# Patient Record
Sex: FEMALE | Race: BLACK OR AFRICAN AMERICAN | Employment: OTHER | URBAN - METROPOLITAN AREA
[De-identification: names, ages, dates, MRNs, and addresses within clinical notes are randomized per-mention and may not be internally consistent; named-entity substitution may affect disease eponyms.]

---

## 2021-04-10 ENCOUNTER — APPOINTMENT (OUTPATIENT)
Dept: CT IMAGING | Age: 86
End: 2021-04-10
Payer: MEDICARE

## 2021-04-10 ENCOUNTER — HOSPITAL ENCOUNTER (EMERGENCY)
Age: 86
Discharge: HOME | End: 2021-04-10
Attending: STUDENT IN AN ORGANIZED HEALTH CARE EDUCATION/TRAINING PROGRAM
Payer: MEDICARE

## 2021-04-10 VITALS
TEMPERATURE: 97.5 F | HEIGHT: 62 IN | HEART RATE: 68 BPM | OXYGEN SATURATION: 99 % | BODY MASS INDEX: 30.91 KG/M2 | DIASTOLIC BLOOD PRESSURE: 74 MMHG | WEIGHT: 168 LBS | SYSTOLIC BLOOD PRESSURE: 172 MMHG | RESPIRATION RATE: 16 BRPM

## 2021-04-10 DIAGNOSIS — I10 UNCONTROLLED HYPERTENSION: Primary | ICD-10-CM

## 2021-04-10 LAB
A/G RATIO: 1.2 (ref 1.1–2.2)
ALBUMIN SERPL-MCNC: 4.1 G/DL (ref 3.4–5)
ALP BLD-CCNC: 102 U/L (ref 40–129)
ALT SERPL-CCNC: 13 U/L (ref 10–40)
ANION GAP SERPL CALCULATED.3IONS-SCNC: 10 MMOL/L (ref 3–16)
AST SERPL-CCNC: 24 U/L (ref 15–37)
BASOPHILS ABSOLUTE: 0 K/UL (ref 0–0.2)
BASOPHILS RELATIVE PERCENT: 0.3 %
BILIRUB SERPL-MCNC: 0.5 MG/DL (ref 0–1)
BUN BLDV-MCNC: 14 MG/DL (ref 7–20)
CALCIUM SERPL-MCNC: 9.9 MG/DL (ref 8.3–10.6)
CHLORIDE BLD-SCNC: 97 MMOL/L (ref 99–110)
CO2: 30 MMOL/L (ref 21–32)
CREAT SERPL-MCNC: 0.8 MG/DL (ref 0.6–1.2)
EOSINOPHILS ABSOLUTE: 0 K/UL (ref 0–0.6)
EOSINOPHILS RELATIVE PERCENT: 0.5 %
GFR AFRICAN AMERICAN: >60
GFR NON-AFRICAN AMERICAN: >60
GLOBULIN: 3.5 G/DL
GLUCOSE BLD-MCNC: 97 MG/DL (ref 70–99)
HCT VFR BLD CALC: 41.3 % (ref 36–48)
HEMOGLOBIN: 13.6 G/DL (ref 12–16)
LACTIC ACID: 1 MMOL/L (ref 0.4–2)
LYMPHOCYTES ABSOLUTE: 1.2 K/UL (ref 1–5.1)
LYMPHOCYTES RELATIVE PERCENT: 17.1 %
MAGNESIUM: 1.7 MG/DL (ref 1.8–2.4)
MCH RBC QN AUTO: 28.1 PG (ref 26–34)
MCHC RBC AUTO-ENTMCNC: 32.8 G/DL (ref 31–36)
MCV RBC AUTO: 85.7 FL (ref 80–100)
MONOCYTES ABSOLUTE: 0.7 K/UL (ref 0–1.3)
MONOCYTES RELATIVE PERCENT: 10.2 %
NEUTROPHILS ABSOLUTE: 5 K/UL (ref 1.7–7.7)
NEUTROPHILS RELATIVE PERCENT: 71.9 %
PDW BLD-RTO: 13.8 % (ref 12.4–15.4)
PLATELET # BLD: 245 K/UL (ref 135–450)
PMV BLD AUTO: 8.7 FL (ref 5–10.5)
POTASSIUM REFLEX MAGNESIUM: 2.9 MMOL/L (ref 3.5–5.1)
RBC # BLD: 4.82 M/UL (ref 4–5.2)
SODIUM BLD-SCNC: 137 MMOL/L (ref 136–145)
TOTAL PROTEIN: 7.6 G/DL (ref 6.4–8.2)
TROPONIN: <0.01 NG/ML
WBC # BLD: 6.9 K/UL (ref 4–11)

## 2021-04-10 PROCEDURE — 93005 ELECTROCARDIOGRAM TRACING: CPT | Performed by: STUDENT IN AN ORGANIZED HEALTH CARE EDUCATION/TRAINING PROGRAM

## 2021-04-10 PROCEDURE — 83735 ASSAY OF MAGNESIUM: CPT

## 2021-04-10 PROCEDURE — 6370000000 HC RX 637 (ALT 250 FOR IP): Performed by: STUDENT IN AN ORGANIZED HEALTH CARE EDUCATION/TRAINING PROGRAM

## 2021-04-10 PROCEDURE — 84484 ASSAY OF TROPONIN QUANT: CPT

## 2021-04-10 PROCEDURE — 70450 CT HEAD/BRAIN W/O DYE: CPT

## 2021-04-10 PROCEDURE — 85025 COMPLETE CBC W/AUTO DIFF WBC: CPT

## 2021-04-10 PROCEDURE — 96365 THER/PROPH/DIAG IV INF INIT: CPT

## 2021-04-10 PROCEDURE — 83605 ASSAY OF LACTIC ACID: CPT

## 2021-04-10 PROCEDURE — 36415 COLL VENOUS BLD VENIPUNCTURE: CPT

## 2021-04-10 PROCEDURE — 6360000002 HC RX W HCPCS: Performed by: STUDENT IN AN ORGANIZED HEALTH CARE EDUCATION/TRAINING PROGRAM

## 2021-04-10 PROCEDURE — 99284 EMERGENCY DEPT VISIT MOD MDM: CPT

## 2021-04-10 PROCEDURE — 96366 THER/PROPH/DIAG IV INF ADDON: CPT

## 2021-04-10 PROCEDURE — 80053 COMPREHEN METABOLIC PANEL: CPT

## 2021-04-10 RX ORDER — ATENOLOL 100 MG/1
150 TABLET ORAL DAILY
COMMUNITY
Start: 2020-11-09

## 2021-04-10 RX ORDER — POTASSIUM CHLORIDE 20 MEQ/1
20 TABLET, EXTENDED RELEASE ORAL ONCE
Status: COMPLETED | OUTPATIENT
Start: 2021-04-10 | End: 2021-04-10

## 2021-04-10 RX ORDER — HYDROCHLOROTHIAZIDE 12.5 MG/1
12.5 CAPSULE, GELATIN COATED ORAL DAILY
COMMUNITY
Start: 2021-03-16 | End: 2021-05-23

## 2021-04-10 RX ORDER — POTASSIUM CHLORIDE 20 MEQ/1
40 TABLET, EXTENDED RELEASE ORAL ONCE
Status: COMPLETED | OUTPATIENT
Start: 2021-04-10 | End: 2021-04-10

## 2021-04-10 RX ORDER — HYDROCHLOROTHIAZIDE 25 MG/1
TABLET ORAL
COMMUNITY
Start: 2021-04-06 | End: 2021-05-23

## 2021-04-10 RX ORDER — ASPIRIN 81 MG/1
81 TABLET ORAL DAILY
COMMUNITY

## 2021-04-10 RX ORDER — MAGNESIUM SULFATE IN WATER 40 MG/ML
2000 INJECTION, SOLUTION INTRAVENOUS ONCE
Status: COMPLETED | OUTPATIENT
Start: 2021-04-10 | End: 2021-04-10

## 2021-04-10 RX ORDER — ATORVASTATIN CALCIUM 80 MG/1
TABLET, FILM COATED ORAL
COMMUNITY
Start: 2020-10-02

## 2021-04-10 RX ADMIN — POTASSIUM CHLORIDE 20 MEQ: 1500 TABLET, EXTENDED RELEASE ORAL at 16:56

## 2021-04-10 RX ADMIN — POTASSIUM CHLORIDE 40 MEQ: 1500 TABLET, EXTENDED RELEASE ORAL at 16:56

## 2021-04-10 RX ADMIN — MAGNESIUM SULFATE HEPTAHYDRATE 2000 MG: 40 INJECTION, SOLUTION INTRAVENOUS at 16:56

## 2021-04-10 ASSESSMENT — ENCOUNTER SYMPTOMS
FACIAL SWELLING: 0
CHEST TIGHTNESS: 0
EYE DISCHARGE: 0
BLOOD IN STOOL: 0
COUGH: 0
ABDOMINAL PAIN: 0
ANAL BLEEDING: 0
CONSTIPATION: 0
APNEA: 0
SHORTNESS OF BREATH: 0
WHEEZING: 0
EYE PAIN: 0
EYE ITCHING: 0
ABDOMINAL DISTENTION: 0

## 2021-04-10 NOTE — ED PROVIDER NOTES
[id number] RESIDENT NOTE       Date of evaluation: 4/10/2021    Chief Complaint     Hypertension (x2 weeks, recently BP medication increase with no relief, +tinnitis)    History of Present Illness     Esau Ernst is a 80 y.o. female past medical history of hypertension, hyperlipidemia, right coronary artery blockage status post aborted stent in 2006 who presented with hypertension. Patient has had multiple visits to the ED where she presents with hypertension above 180s which subsequently comes down when she is discharged. Review of Systems     Review of Systems   Constitutional: Negative for activity change, appetite change, chills and fever. HENT: Positive for tinnitus. Negative for dental problem, drooling, ear discharge, ear pain and facial swelling. Eyes: Negative for pain, discharge and itching. Respiratory: Negative for apnea, cough, chest tightness, shortness of breath and wheezing. Cardiovascular: Negative for chest pain, palpitations and leg swelling. Gastrointestinal: Negative for abdominal distention, abdominal pain, anal bleeding, blood in stool and constipation. Endocrine: Negative for cold intolerance and heat intolerance. Genitourinary: Negative for difficulty urinating, dyspareunia, dysuria, frequency and hematuria. Musculoskeletal: Negative for arthralgias and gait problem. Neurological: Negative for dizziness, seizures, facial asymmetry, speech difficulty and headaches. Psychiatric/Behavioral: Negative for agitation and behavioral problems. Past Medical, Surgical, Family, and Social History     She has a past medical history of Hyperlipidemia and Hypertension. She has no past surgical history on file. Her family history is not on file. She reports that she has never smoked. She has never used smokeless tobacco. She reports previous alcohol use. She reports previous drug use.     Medications     Current Discharge Medication List      CONTINUE these medications which have NOT CHANGED    Details   atenolol (TENORMIN) 100 MG tablet Take 150 mg by mouth daily      atorvastatin (LIPITOR) 80 MG tablet TAKE 1 TABLET BY MOUTH  DAILY      hydroCHLOROthiazide (HYDRODIURIL) 25 MG tablet TAKE 1 TABLET(25 MG) BY MOUTH DAILY      hydroCHLOROthiazide (MICROZIDE) 12.5 MG capsule Take 12.5 mg by mouth daily      aspirin 81 MG EC tablet Take 81 mg by mouth daily             Allergies     She has No Known Allergies. Physical Exam     INITIAL VITALS: BP: (!) 215/76, Temp: 97.5 °F (36.4 °C), Pulse: 66, Resp: 17, SpO2: 97 %   Physical Exam  Constitutional:       General: She is not in acute distress. Appearance: Normal appearance. She is obese. She is not ill-appearing. HENT:      Head: Normocephalic and atraumatic. Mouth/Throat:      Mouth: Mucous membranes are moist.   Eyes:      Extraocular Movements: Extraocular movements intact. Cardiovascular:      Rate and Rhythm: Normal rate and regular rhythm. Pulses: Normal pulses. Heart sounds: Normal heart sounds. No murmur. No gallop. Pulmonary:      Effort: No respiratory distress. Breath sounds: No wheezing, rhonchi or rales. Chest:      Chest wall: No tenderness. Abdominal:      Palpations: Abdomen is soft. Tenderness: There is no right CVA tenderness or left CVA tenderness. Musculoskeletal:         General: No swelling or tenderness. Right lower leg: No edema. Left lower leg: No edema. Skin:     General: Skin is warm. Capillary Refill: Capillary refill takes less than 2 seconds. Neurological:      Mental Status: She is alert and oriented to person, place, and time. Cranial Nerves: No cranial nerve deficit. Sensory: No sensory deficit.    Psychiatric:         Mood and Affect: Mood normal.         Behavior: Behavior normal.          Diagnostic Results     EKG   Normal sinus rhythm, no ST changes, no bundle branch blocks. RADIOLOGY:  CT HEAD WO CONTRAST   Final Result      1. No findings for acute intracranial abnormality. There is increased attenuation within the left M1 segment of the left middle cerebral artery. This is nonspecific however may reflect \"dense MCA sign\". If there is concern for stroke, follow-up study    or further valuation with MRI imaging is recommended. 2.  Age-related atrophy with extensive periventricular and subcortical white matter changes consistent with chronic small vessel ischemia.           LABS:   Results for orders placed or performed during the hospital encounter of 04/10/21   CBC auto differential   Result Value Ref Range    WBC 6.9 4.0 - 11.0 K/uL    RBC 4.82 4.00 - 5.20 M/uL    Hemoglobin 13.6 12.0 - 16.0 g/dL    Hematocrit 41.3 36.0 - 48.0 %    MCV 85.7 80.0 - 100.0 fL    MCH 28.1 26.0 - 34.0 pg    MCHC 32.8 31.0 - 36.0 g/dL    RDW 13.8 12.4 - 15.4 %    Platelets 022 957 - 893 K/uL    MPV 8.7 5.0 - 10.5 fL    Neutrophils % 71.9 %    Lymphocytes % 17.1 %    Monocytes % 10.2 %    Eosinophils % 0.5 %    Basophils % 0.3 %    Neutrophils Absolute 5.0 1.7 - 7.7 K/uL    Lymphocytes Absolute 1.2 1.0 - 5.1 K/uL    Monocytes Absolute 0.7 0.0 - 1.3 K/uL    Eosinophils Absolute 0.0 0.0 - 0.6 K/uL    Basophils Absolute 0.0 0.0 - 0.2 K/uL   Comprehensive Metabolic Panel w/ Reflex to MG   Result Value Ref Range    Sodium 137 136 - 145 mmol/L    Potassium reflex Magnesium 2.9 (LL) 3.5 - 5.1 mmol/L    Chloride 97 (L) 99 - 110 mmol/L    CO2 30 21 - 32 mmol/L    Anion Gap 10 3 - 16    Glucose 97 70 - 99 mg/dL    BUN 14 7 - 20 mg/dL    CREATININE 0.8 0.6 - 1.2 mg/dL    GFR Non-African American >60 >60    GFR African American >60 >60    Calcium 9.9 8.3 - 10.6 mg/dL    Total Protein 7.6 6.4 - 8.2 g/dL    Albumin 4.1 3.4 - 5.0 g/dL    Albumin/Globulin Ratio 1.2 1.1 - 2.2    Total Bilirubin 0.5 0.0 - 1.0 mg/dL    Alkaline Phosphatase 102 40 - 129 U/L    ALT 13 10 - 40 U/L    AST 24 15 - 37 U/L creased intake. Her electrolytes were replaced. She was educated on the importance of a low-sodium and a diet high in fruits and vegetables. She was instructed to follow-up with her PCP in 3 days for blood pressure management and was instructed on returning to the ED if she develop any new symptoms including but not limited to headache, chest pain, back pain. This patient was also evaluated by the attending physician. All care plans were discussed and agreed upon. Clinical Impression     1.  Uncontrolled hypertension        Disposition     PATIENT REFERRED TO:  Dominic Nunez  [address] [place] [address] [phone]    In 3 days  for blood pressure management      DISCHARGE MEDICATIONS:  Current Discharge Medication List          DISPOSITION     Decision to discharge     Brianda Martinez MD  Resident  04/10/21 6319       Brianda Martinez MD  Resident  04/10/21 6534

## 2021-04-10 NOTE — ED PROVIDER NOTES
ED Attending Attestation Note     Date of evaluation: 4/10/2021    This patient was seen by the resident. I have seen and examined the patient, agree with the workup, evaluation, management and diagnosis. The care plan has been discussed. I have reviewed the ECG and concur with the resident's interpretation. My assessment reveals 79 y/o F with hx of HTN, HLD presents to the ED for elevated blood pressure readings. This has been an ongoing issue for her and she has been on several different blood pressure medications. Her doctor recently increased her HCTZ dose. She had a dull headache earlier today and noted her BP to be >200 on her home machine. She also endorses \"ringing\" in her ears. Denies chest pain, sob, vision change, numbness, tingling, speech change, gait change. On arrival,  systolic in triage, improved to 179 on exam.  Trended down to 165/66 in the ED without any medication. GCS 15, CN 2-12 intact, BUE/BLE strength 5/5, sensation grossly intact, gait normal.    CT head negative for acute intracranial process. Radiologist did note nonspecific finding of left M1 MCA segment hyperattenuation which could be a dense MCA sign. However, the patient has no stroke like symptoms and repeat neuro exam prior to discharge remained normal.  I do not feel that she is having a stroke or that MRI is warranted at this time. EKG negative for ischemic changes. Labs notable for hypokalemia and hypomagnesemia. Repletion ordered in the ED. Patient's BP remained stable. She and her family were counseled extensively on the need to follow up with PCP for better BP control. She was instructed to take one of her BP meds in the morning and the other at night (she has been taking both together) to see if it helps control the drastic swings in her BP. She was also given return precautions for any worsening symptoms or signs of stroke.      Jerrod Romero MD  04/10/21 [id number]

## 2021-04-10 NOTE — ED NOTES
Bed: B24-24  Expected date:   Expected time:   Means of arrival:   Comments:  [address] Rio Vazquez RN  04/10/21 1506

## 2021-04-11 LAB
EKG ATRIAL RATE: 60 BPM
EKG DIAGNOSIS: NORMAL
EKG P AXIS: 60 DEGREES
EKG P-R INTERVAL: 220 MS
EKG Q-T INTERVAL: 408 MS
EKG QRS DURATION: 76 MS
EKG QTC CALCULATION (BAZETT): 408 MS
EKG R AXIS: 2 DEGREES
EKG T AXIS: 24 DEGREES
EKG VENTRICULAR RATE: 60 BPM

## 2021-04-23 ENCOUNTER — HOSPITAL ENCOUNTER (EMERGENCY)
Age: 86
Discharge: HOME | End: 2021-04-23
Attending: EMERGENCY MEDICINE
Payer: MEDICARE

## 2021-04-23 ENCOUNTER — APPOINTMENT (OUTPATIENT)
Dept: GENERAL RADIOLOGY | Age: 86
End: 2021-04-23
Payer: MEDICARE

## 2021-04-23 VITALS
TEMPERATURE: 97.8 F | SYSTOLIC BLOOD PRESSURE: 168 MMHG | BODY MASS INDEX: 31.19 KG/M2 | WEIGHT: 169.5 LBS | HEIGHT: 62 IN | HEART RATE: 58 BPM | OXYGEN SATURATION: 95 % | DIASTOLIC BLOOD PRESSURE: 69 MMHG | RESPIRATION RATE: 16 BRPM

## 2021-04-23 DIAGNOSIS — I10 ESSENTIAL HYPERTENSION: ICD-10-CM

## 2021-04-23 DIAGNOSIS — E87.6 HYPOKALEMIA: Primary | ICD-10-CM

## 2021-04-23 LAB
A/G RATIO: 1.3 (ref 1.1–2.2)
ALBUMIN SERPL-MCNC: 3.9 G/DL (ref 3.4–5)
ALP BLD-CCNC: 93 U/L (ref 40–129)
ALT SERPL-CCNC: 13 U/L (ref 10–40)
ANION GAP SERPL CALCULATED.3IONS-SCNC: 10 MMOL/L (ref 3–16)
AST SERPL-CCNC: 22 U/L (ref 15–37)
BACTERIA: ABNORMAL /HPF
BASOPHILS ABSOLUTE: 0 K/UL (ref 0–0.2)
BASOPHILS RELATIVE PERCENT: 0.5 %
BILIRUB SERPL-MCNC: 0.6 MG/DL (ref 0–1)
BILIRUBIN URINE: NEGATIVE
BLOOD, URINE: NEGATIVE
BUN BLDV-MCNC: 11 MG/DL (ref 7–20)
CALCIUM SERPL-MCNC: 9.7 MG/DL (ref 8.3–10.6)
CHLORIDE BLD-SCNC: 95 MMOL/L (ref 99–110)
CLARITY: CLEAR
CO2: 29 MMOL/L (ref 21–32)
COLOR: YELLOW
CREAT SERPL-MCNC: 0.9 MG/DL (ref 0.6–1.2)
EKG ATRIAL RATE: 60 BPM
EKG DIAGNOSIS: NORMAL
EKG P AXIS: 64 DEGREES
EKG P-R INTERVAL: 208 MS
EKG Q-T INTERVAL: 390 MS
EKG QRS DURATION: 76 MS
EKG QTC CALCULATION (BAZETT): 390 MS
EKG R AXIS: 8 DEGREES
EKG T AXIS: 43 DEGREES
EKG VENTRICULAR RATE: 60 BPM
EOSINOPHILS ABSOLUTE: 0.1 K/UL (ref 0–0.6)
EOSINOPHILS RELATIVE PERCENT: 0.9 %
EPITHELIAL CELLS, UA: ABNORMAL /HPF (ref 0–5)
GFR AFRICAN AMERICAN: >60
GFR NON-AFRICAN AMERICAN: 59
GLOBULIN: 3 G/DL
GLUCOSE BLD-MCNC: 118 MG/DL (ref 70–99)
GLUCOSE URINE: NEGATIVE MG/DL
HCT VFR BLD CALC: 42.7 % (ref 36–48)
HEMOGLOBIN: 14.3 G/DL (ref 12–16)
KETONES, URINE: NEGATIVE MG/DL
LEUKOCYTE ESTERASE, URINE: ABNORMAL
LIPASE: 26 U/L (ref 13–60)
LYMPHOCYTES ABSOLUTE: 1.5 K/UL (ref 1–5.1)
LYMPHOCYTES RELATIVE PERCENT: 22.6 %
MAGNESIUM: 1.8 MG/DL (ref 1.8–2.4)
MCH RBC QN AUTO: 28.3 PG (ref 26–34)
MCHC RBC AUTO-ENTMCNC: 33.5 G/DL (ref 31–36)
MCV RBC AUTO: 84.6 FL (ref 80–100)
MICROSCOPIC EXAMINATION: YES
MONOCYTES ABSOLUTE: 0.7 K/UL (ref 0–1.3)
MONOCYTES RELATIVE PERCENT: 11 %
NEUTROPHILS ABSOLUTE: 4.4 K/UL (ref 1.7–7.7)
NEUTROPHILS RELATIVE PERCENT: 65 %
NITRITE, URINE: NEGATIVE
PDW BLD-RTO: 13.7 % (ref 12.4–15.4)
PH UA: 7 (ref 5–8)
PLATELET # BLD: 246 K/UL (ref 135–450)
PMV BLD AUTO: 8.9 FL (ref 5–10.5)
POTASSIUM REFLEX MAGNESIUM: 3.1 MMOL/L (ref 3.5–5.1)
PRO-BNP: 143 PG/ML (ref 0–449)
PROTEIN UA: NEGATIVE MG/DL
RBC # BLD: 5.05 M/UL (ref 4–5.2)
RBC UA: ABNORMAL /HPF (ref 0–4)
SODIUM BLD-SCNC: 134 MMOL/L (ref 136–145)
SPECIFIC GRAVITY UA: 1.01 (ref 1–1.03)
TOTAL PROTEIN: 6.9 G/DL (ref 6.4–8.2)
TROPONIN: <0.01 NG/ML
URINE TYPE: ABNORMAL
UROBILINOGEN, URINE: 0.2 E.U./DL
WBC # BLD: 6.7 K/UL (ref 4–11)
WBC UA: ABNORMAL /HPF (ref 0–5)

## 2021-04-23 PROCEDURE — 83735 ASSAY OF MAGNESIUM: CPT

## 2021-04-23 PROCEDURE — 99285 EMERGENCY DEPT VISIT HI MDM: CPT

## 2021-04-23 PROCEDURE — 6370000000 HC RX 637 (ALT 250 FOR IP): Performed by: EMERGENCY MEDICINE

## 2021-04-23 PROCEDURE — 83880 ASSAY OF NATRIURETIC PEPTIDE: CPT

## 2021-04-23 PROCEDURE — 80053 COMPREHEN METABOLIC PANEL: CPT

## 2021-04-23 PROCEDURE — 83690 ASSAY OF LIPASE: CPT

## 2021-04-23 PROCEDURE — 81001 URINALYSIS AUTO W/SCOPE: CPT

## 2021-04-23 PROCEDURE — 85025 COMPLETE CBC W/AUTO DIFF WBC: CPT

## 2021-04-23 PROCEDURE — 93005 ELECTROCARDIOGRAM TRACING: CPT | Performed by: EMERGENCY MEDICINE

## 2021-04-23 PROCEDURE — 84484 ASSAY OF TROPONIN QUANT: CPT

## 2021-04-23 PROCEDURE — 71045 X-RAY EXAM CHEST 1 VIEW: CPT

## 2021-04-23 PROCEDURE — 36415 COLL VENOUS BLD VENIPUNCTURE: CPT

## 2021-04-23 RX ORDER — POTASSIUM CHLORIDE 20 MEQ/1
20 TABLET, EXTENDED RELEASE ORAL ONCE
Status: COMPLETED | OUTPATIENT
Start: 2021-04-23 | End: 2021-04-23

## 2021-04-23 RX ORDER — POTASSIUM CHLORIDE 20 MEQ/1
20 TABLET, EXTENDED RELEASE ORAL DAILY
Qty: 30 TABLET | Refills: 0 | Status: SHIPPED | OUTPATIENT
Start: 2021-04-23

## 2021-04-23 RX ADMIN — POTASSIUM CHLORIDE 20 MEQ: 1500 TABLET, EXTENDED RELEASE ORAL at 06:54

## 2021-04-23 ASSESSMENT — ENCOUNTER SYMPTOMS
DIARRHEA: 0
COUGH: 0
SORE THROAT: 0
SHORTNESS OF BREATH: 0
VOMITING: 0

## 2021-04-23 NOTE — ED NOTES
Bed: A10-10  Expected date:   Expected time:   Means of arrival:   Comments:  EULOGIO 0999 Dutch Morales RN  04/23/21 5077

## 2021-04-23 NOTE — ED NOTES
Pt states that she woke up at 0100 with abdominal/chest pain that is pressure like in nature. Pt thought it was gas, but when it didn't improve, she checked her blood pressure at home and states it was >610 systolic. Pt called 911 to bring her to the ED for further evaluation.       Esdras Soto RN  04/23/21 1038

## 2021-04-23 NOTE — ED PROVIDER NOTES
past medical history of Hyperlipidemia and Hypertension. She has no past surgical history on file. Her family history is not on file. She reports that she has never smoked. She has never used smokeless tobacco. She reports previous alcohol use. She reports previous drug use. Medications     Previous Medications    ASPIRIN 81 MG EC TABLET    Take 81 mg by mouth daily    ATENOLOL (TENORMIN) 100 MG TABLET    Take 150 mg by mouth daily    ATORVASTATIN (LIPITOR) 80 MG TABLET    TAKE 1 TABLET BY MOUTH  DAILY    HYDROCHLOROTHIAZIDE (HYDRODIURIL) 25 MG TABLET    TAKE 1 TABLET(25 MG) BY MOUTH DAILY    HYDROCHLOROTHIAZIDE (MICROZIDE) 12.5 MG CAPSULE    Take 12.5 mg by mouth daily       Allergies     She has No Known Allergies. Physical Exam     VITALS: BP (!) 168/69   Pulse 58   Temp 97.8 °F (36.6 °C) (Oral)   Resp 16   Ht 5' 2\" (1.575 m)   Wt 169 lb 8 oz (76.9 kg)   SpO2 95%   Breastfeeding No   BMI 31.00 kg/m²    General: Well developed, well nourished female in no acute distress. HEENT: Sclera white, conjunctiva pink, mucous membranes moist and oropharynx clear  Neck: Supple, no meningismus or JVD  Respirations: Unlabored with symmetric chest rise and fall, lungs clear  CV: Regular rate and rhythm with strong distal pulses, no murmur  Abd: Soft without rebound guarding distention or focal tenderness  Musculoskeletal: Moves all extremities with no signs of orthopedic trauma or edema. Skin: Warm and dry with no rashes or lesions. Neuro: Awake and alert with no focal neurologic deficits. Normal speech and gait. Face symmetric. Extraocular movements are intact. Pupils normal.  Sensation intact in all 4 extremities. Psych: Mood and affect are normal.    Diagnostic Results     EKG   Indication: Epigastric pressure. Interpreted by me. Normal sinus rhythm, normal axis and intervals, rate is 60. No ST segment or T wave changes.   Interpretation: Normal sinus rhythm without signs of acute ischemia or infarction.     RADIOLOGY:  XR CHEST PORTABLE   Final Result     No acute findings in the chest.              LABS:   Results for orders placed or performed during the hospital encounter of 04/23/21   CBC Auto Differential   Result Value Ref Range    WBC 6.7 4.0 - 11.0 K/uL    RBC 5.05 4.00 - 5.20 M/uL    Hemoglobin 14.3 12.0 - 16.0 g/dL    Hematocrit 42.7 36.0 - 48.0 %    MCV 84.6 80.0 - 100.0 fL    MCH 28.3 26.0 - 34.0 pg    MCHC 33.5 31.0 - 36.0 g/dL    RDW 13.7 12.4 - 15.4 %    Platelets 232 091 - 179 K/uL    MPV 8.9 5.0 - 10.5 fL    Neutrophils % 65.0 %    Lymphocytes % 22.6 %    Monocytes % 11.0 %    Eosinophils % 0.9 %    Basophils % 0.5 %    Neutrophils Absolute 4.4 1.7 - 7.7 K/uL    Lymphocytes Absolute 1.5 1.0 - 5.1 K/uL    Monocytes Absolute 0.7 0.0 - 1.3 K/uL    Eosinophils Absolute 0.1 0.0 - 0.6 K/uL    Basophils Absolute 0.0 0.0 - 0.2 K/uL   Comprehensive Metabolic Panel w/ Reflex to MG   Result Value Ref Range    Sodium 134 (L) 136 - 145 mmol/L    Potassium reflex Magnesium 3.1 (L) 3.5 - 5.1 mmol/L    Chloride 95 (L) 99 - 110 mmol/L    CO2 29 21 - 32 mmol/L    Anion Gap 10 3 - 16    Glucose 118 (H) 70 - 99 mg/dL    BUN 11 7 - 20 mg/dL    CREATININE 0.9 0.6 - 1.2 mg/dL    GFR Non- 59 (A) >60    GFR African American >60 >60    Calcium 9.7 8.3 - 10.6 mg/dL    Total Protein 6.9 6.4 - 8.2 g/dL    Albumin 3.9 3.4 - 5.0 g/dL    Albumin/Globulin Ratio 1.3 1.1 - 2.2    Total Bilirubin 0.6 0.0 - 1.0 mg/dL    Alkaline Phosphatase 93 40 - 129 U/L    ALT 13 10 - 40 U/L    AST 22 15 - 37 U/L    Globulin 3.0 g/dL   Lipase   Result Value Ref Range    Lipase 26.0 13.0 - 60.0 U/L   Urinalysis, reflex to microscopic   Result Value Ref Range    Color, UA Yellow Straw/Yellow    Clarity, UA Clear Clear    Glucose, Ur Negative Negative mg/dL    Bilirubin Urine Negative Negative    Ketones, Urine Negative Negative mg/dL    Specific Gravity, UA 1.010 1.005 - 1.030    Blood, Urine Negative Negative    pH, UA 7.0 pressure measurements, and general outpatient course. They are comfortable in continuing to follow with the primary care physician. MEDICAL DECISION MAKING / ASSESSMENT / Tania Esteban is a 80 y.o. female who presents to the emergency department concerned about her blood pressure and with epigastric discomfort. Her epigastric symptoms are not wholly consistent with acute coronary syndrome, as they are not exertional and not associated with other symptoms. EKG and troponin are reassuring. Labs evaluating for kidney disease and hepatobiliary source of her symptoms are also reassuring. No evidence of urinary tract infection. She does have a history of GERD, endorses that this feels somewhat similar, and that she is not overly concerned with this aspect of her presentation. We discussed the potential of cross-sectional imaging to further evaluate, and she politely declines. She will come back to the emergency department if the symptoms worsen or if new ones develop. She is most concerned about her elevated blood pressure and variability in her blood pressure. No evidence of endorgan dysfunction. Her medications were adjusted less than 2 weeks ago, and she has planned follow-up in a few days. She is encouraged to continue her medications and follow-up as planned. She is also encouraged to only take her blood pressure once a day, and to monitor for symptom development over a numerical value. We discussed signs and symptoms of stroke, CHF, renal failure, and MI. She was found to have mild hypokalemia without significant hypomagnesemia. Her new medication includes hydrochlorothiazide, which may be a contributor. She was given potassium in the emergency department, and will be given a prescription to go home. She is encouraged to continue to follow this with her primary care physician as her medications are adjusted. Clinical Impression     1. Hypokalemia    2.  Essential hypertension Disposition     PATIENT REFERRED TO:  Christy Glover  [address]  [address] [facility] [address] [phone]    Go to   For re-evaluation, follow-up, and discuss ED visit on Monday as already scheduled    [facility], [facility]. Emergency [address]  [phone]    As needed, If symptoms worsen      DISCHARGE MEDICATIONS:  Discharge Medication List as of 4/23/2021  6:51 AM      START taking these medications    Details   potassium chloride (KLOR-CON M) 20 MEQ extended release tablet Take 1 tablet by mouth daily, Disp-30 tablet, R-0Print             DISPOSITION Decision To Discharge 04/23/2021 06:30:05 AM          Charmayne Lamer, MD  04/23/21 0578

## 2021-04-23 NOTE — ED NOTES
Patient prepared for and ready to be discharged. Dressed in clothes and given belongings. IV removed, pt tolerated well, no complications. Patient discharged at this time in no acute distress after she verbalized understanding of discharge instructions. Reviewed medications, and when to return to the ED with patient. Encouraged follow up with PCP  Patient walked to lobby, Family to take patient home.        Nicol Fraire RN  04/23/21 7545

## 2021-05-23 ENCOUNTER — APPOINTMENT (OUTPATIENT)
Dept: GENERAL RADIOLOGY | Age: 86
End: 2021-05-23
Payer: MEDICARE

## 2021-05-23 ENCOUNTER — HOSPITAL ENCOUNTER (EMERGENCY)
Age: 86
Discharge: HOME | End: 2021-05-23
Attending: EMERGENCY MEDICINE
Payer: MEDICARE

## 2021-05-23 VITALS
OXYGEN SATURATION: 100 % | HEART RATE: 65 BPM | DIASTOLIC BLOOD PRESSURE: 64 MMHG | RESPIRATION RATE: 17 BRPM | SYSTOLIC BLOOD PRESSURE: 149 MMHG | TEMPERATURE: 97.8 F

## 2021-05-23 DIAGNOSIS — I10 EPISODE OF HYPERTENSION: Primary | ICD-10-CM

## 2021-05-23 LAB
ANION GAP SERPL CALCULATED.3IONS-SCNC: 10 MMOL/L (ref 3–16)
BASOPHILS ABSOLUTE: 0 K/UL (ref 0–0.2)
BASOPHILS RELATIVE PERCENT: 0.7 %
BUN BLDV-MCNC: 15 MG/DL (ref 7–20)
CALCIUM SERPL-MCNC: 10.1 MG/DL (ref 8.3–10.6)
CHLORIDE BLD-SCNC: 99 MMOL/L (ref 99–110)
CO2: 28 MMOL/L (ref 21–32)
CREAT SERPL-MCNC: 0.8 MG/DL (ref 0.6–1.2)
EKG ATRIAL RATE: 62 BPM
EKG DIAGNOSIS: NORMAL
EKG P AXIS: 60 DEGREES
EKG P-R INTERVAL: 218 MS
EKG Q-T INTERVAL: 392 MS
EKG QRS DURATION: 86 MS
EKG QTC CALCULATION (BAZETT): 397 MS
EKG R AXIS: 10 DEGREES
EKG T AXIS: 24 DEGREES
EKG VENTRICULAR RATE: 62 BPM
EOSINOPHILS ABSOLUTE: 0.1 K/UL (ref 0–0.6)
EOSINOPHILS RELATIVE PERCENT: 1.4 %
GFR AFRICAN AMERICAN: >60
GFR NON-AFRICAN AMERICAN: >60
GLUCOSE BLD-MCNC: 100 MG/DL (ref 70–99)
HCT VFR BLD CALC: 38.1 % (ref 36–48)
HEMOGLOBIN: 12.6 G/DL (ref 12–16)
LYMPHOCYTES ABSOLUTE: 1.1 K/UL (ref 1–5.1)
LYMPHOCYTES RELATIVE PERCENT: 20 %
MCH RBC QN AUTO: 28.4 PG (ref 26–34)
MCHC RBC AUTO-ENTMCNC: 33.1 G/DL (ref 31–36)
MCV RBC AUTO: 85.7 FL (ref 80–100)
MONOCYTES ABSOLUTE: 0.6 K/UL (ref 0–1.3)
MONOCYTES RELATIVE PERCENT: 9.9 %
NEUTROPHILS ABSOLUTE: 3.9 K/UL (ref 1.7–7.7)
NEUTROPHILS RELATIVE PERCENT: 68 %
PDW BLD-RTO: 14.1 % (ref 12.4–15.4)
PLATELET # BLD: 231 K/UL (ref 135–450)
PMV BLD AUTO: 8.7 FL (ref 5–10.5)
POTASSIUM REFLEX MAGNESIUM: 4 MMOL/L (ref 3.5–5.1)
RBC # BLD: 4.45 M/UL (ref 4–5.2)
SODIUM BLD-SCNC: 137 MMOL/L (ref 136–145)
TROPONIN: <0.01 NG/ML
WBC # BLD: 5.7 K/UL (ref 4–11)

## 2021-05-23 PROCEDURE — 71046 X-RAY EXAM CHEST 2 VIEWS: CPT

## 2021-05-23 PROCEDURE — 85025 COMPLETE CBC W/AUTO DIFF WBC: CPT

## 2021-05-23 PROCEDURE — 99283 EMERGENCY DEPT VISIT LOW MDM: CPT

## 2021-05-23 PROCEDURE — 84484 ASSAY OF TROPONIN QUANT: CPT

## 2021-05-23 PROCEDURE — 36415 COLL VENOUS BLD VENIPUNCTURE: CPT

## 2021-05-23 PROCEDURE — 80048 BASIC METABOLIC PNL TOTAL CA: CPT

## 2021-05-23 PROCEDURE — 93005 ELECTROCARDIOGRAM TRACING: CPT | Performed by: EMERGENCY MEDICINE

## 2021-05-23 RX ORDER — LISINOPRIL AND HYDROCHLOROTHIAZIDE 12.5; 1 MG/1; MG/1
1 TABLET ORAL DAILY
COMMUNITY
Start: 2021-04-12

## 2021-05-23 ASSESSMENT — PAIN SCALES - GENERAL: PAINLEVEL_OUTOF10: 3

## 2021-05-23 ASSESSMENT — PAIN DESCRIPTION - DESCRIPTORS: DESCRIPTORS: ACHING

## 2021-05-23 ASSESSMENT — PAIN DESCRIPTION - PAIN TYPE: TYPE: ACUTE PAIN

## 2021-05-23 ASSESSMENT — PAIN DESCRIPTION - LOCATION: LOCATION: HEAD

## 2021-05-23 NOTE — ED PROVIDER NOTES
dysarthria or obvious aphasia. Moving all extremities spontaneously. Skin: Warm, dry. No rash. No diaphoresis or erythema. Psychiatric: Calm and cooperative with appropriate mood and affect. Procedures   Procedures    MEDICAL DECISION MAKING     MDM: Ro Parker is a 80 y.o. female with history as above presenting to the emergency department today for concerns of hypertension and upper abdominal/lower chest pressure that has since resolved. On arrival, she is in no distress, vital signs notable for blood pressure in the 457Q systolic. This does spontaneously improve into the 140s without further intervention. Her physical exam is very reassuring and her EKG is unchanged from previous. She has no further symptoms at this time, however a basic work-up with labs, chest x-ray, and troponin were obtained. Her troponin is negative, chest x-ray reassuring, and given no new EKG changes, I feel this is very unlikely to represent a cardiac source. She is overall reassured and feels comfortable returning home. We will continue to monitor blood pressure routinely and follow closely with her PCP. Discharged in stable condition with written and verbal instructions for which to return to the ED. Clinical Impression     1. Episode of hypertension        Disposition     DISPOSITION Decision To Discharge 05/23/2021 05:09:29 AM        Sherrie Barber MD  7:40 AM                     Past Medical, Surgical, Family, and Social History     She has a past medical history of Hyperlipidemia and Hypertension. She has no past surgical history on file. Her family history is not on file. She reports that she has never smoked. She has never used smokeless tobacco. She reports previous alcohol use. She reports previous drug use.     Medications     Discharge Medication List as of 5/23/2021  5:34 AM      CONTINUE these medications which have NOT CHANGED    Details   lisinopril-hydroCHLOROthiazide (PRINZIDE;ZESTORETIC) 10-12.5 MG per tablet Take 1 tablet by mouth dailyHistorical Med      potassium chloride (KLOR-CON M) 20 MEQ extended release tablet Take 1 tablet by mouth daily, Disp-30 tablet, R-0Print      atenolol (TENORMIN) 100 MG tablet Take 150 mg by mouth dailyHistorical Med      aspirin 81 MG EC tablet Take 81 mg by mouth dailyHistorical Med      atorvastatin (LIPITOR) 80 MG tablet TAKE 1 TABLET BY MOUTH  DAILYHistorical Med             Allergies     She has No Known Allergies. ED Course     Nursing Notes, Past Medical Hx, Past Surgical Hx, Social Hx,Allergies, and Family Hx were reviewed. Patient was given the following medications:  No orders of the defined types were placed in this encounter. Diagnostic Results     EKG   Normal sinus rhythm, measurable intervals normal, axis normal.  No ST or T wave changes suggestive of acute ischemia. Compared to previous dated 4/23/2021, not significantly changed. RECENT VITALS:  BP: (!) 149/64,Temp: 97.8 °F (36.6 °C), Pulse: 65, Resp: 17, SpO2: 100 %     RADIOLOGY:  XR CHEST (2 VW)   Final Result     No acute cardiopulmonary process or significant alteration from the    previous examination.           LABS:   Results for orders placed or performed during the hospital encounter of 05/23/21   CBC Auto Differential   Result Value Ref Range    WBC 5.7 4.0 - 11.0 K/uL    RBC 4.45 4.00 - 5.20 M/uL    Hemoglobin 12.6 12.0 - 16.0 g/dL    Hematocrit 38.1 36.0 - 48.0 %    MCV 85.7 80.0 - 100.0 fL    MCH 28.4 26.0 - 34.0 pg    MCHC 33.1 31.0 - 36.0 g/dL    RDW 14.1 12.4 - 15.4 %    Platelets 273 045 - 917 K/uL    MPV 8.7 5.0 - 10.5 fL    Neutrophils % 68.0 %    Lymphocytes % 20.0 %    Monocytes % 9.9 %    Eosinophils % 1.4 %    Basophils % 0.7 %    Neutrophils Absolute 3.9 1.7 - 7.7 K/uL    Lymphocytes Absolute 1.1 1.0 - 5.1 K/uL    Monocytes Absolute 0.6 0.0 - 1.3 K/uL    Eosinophils Absolute 0.1 0.0 - 0.6 K/uL    Basophils Absolute 0.0 0.0 - 0.2 K/uL   Basic Metabolic Panel w/ Reflex to MG   Result Value Ref Range    Sodium 137 136 - 145 mmol/L    Potassium reflex Magnesium 4.0 3.5 - 5.1 mmol/L    Chloride 99 99 - 110 mmol/L    CO2 28 21 - 32 mmol/L    Anion Gap 10 3 - 16    Glucose 100 (H) 70 - 99 mg/dL    BUN 15 7 - 20 mg/dL    CREATININE 0.8 0.6 - 1.2 mg/dL    GFR Non-African American >60 >60    GFR African American >60 >60    Calcium 10.1 8.3 - 10.6 mg/dL   Troponin   Result Value Ref Range    Troponin <0.01 <0.01 ng/mL       CONSULTS:  None    PATIENT REFERRED TO:  [facility] Emergency Department  [address] [facility] [address]  [phone]    If symptoms worsen    [place] [address] [facility]    Schedule an appointment as soon as possible for a visit in 5 days  For reexamination      DISCHARGE MEDICATIONS:  Discharge Medication List as of 5/23/2021  5:34 AM             Arvin Beard MD  05/23/21 2158

## 2022-11-01 NOTE — ED NOTES
Attempted IV access x2.  Second nurse to try     Cloretta Charles, DESEAN  04/23/21 9878 Continuity of Care Form    Patient Name: Laina Concepcion   :  1965  MRN:  [id number]    Admit date:  10/31/2022  Discharge date:  2022    Code Status Order: DNR-CC   Advance Directives:     Admitting Physician:  Tao Shin MD  PCP: Hughes Galeazzi, MD    Discharging Nurse: Gorge Garcia Unit/Room#: D8J-1634/2106-01  Discharging Unit Phone Number: [phone]    Emergency Contact:   Extended Emergency Contact Information  Primary Emergency Contact:  [facility] Phone: [phone]  Mobile Phone: [phone]  Relation: Child  Secondary Emergency Contact: [address] Phone: [phone]  Relation: Aunt/Uncle    Past Surgical History:  Past Surgical History:   Procedure Laterality Date    GASTROSTOMY TUBE PLACEMENT N/A 10/13/2022    ESOPHAGOGASTRODUODENOSCOPY WITH  PERCUTANEOUS ENDOSCOPIC GASTROSTOMY  TUBE PLACEMENT performed by Reid Daniel MD at [place] [address]  10/13/2022    EGD BIOPSY performed by Reid Daniel MD at [facility] ENDOSCOPY       Immunization History:   Immunization History   Administered Date(s) Administered    COVID-19, PFIZER PURPLE top, DILUTE for use, (age 15 y+), 30mcg/0.3mL 2021, 2021, 2021       Active Problems:  Patient Active Problem List   Diagnosis Code    Altered mental status R41.82    Hypernatremia E87.0    Hypokalemia E87.6    GARRETT (acute kidney injury) ([facility] [address] N17.9    Severe malnutrition ([facility] [address] E43    Acute encephalopathy G93.40    AMS (altered mental status) R41.82    Septicemia ([facility] [address] A41.9       Isolation/Infection:   Isolation            No Isolation          Patient Infection Status       Infection Onset Added Last Indicated Last Indicated By Review Planned Expiration Resolved Resolved By    None active    Resolved    COVID-19 10/31/22 10/31/22 10/31/22 COVID-19, Rapid   78/20/61 Derik Mullerberg    Negative Test 10/31    COVID-19 (Rule Out) 10/31/22 10/31/22 10/31/22 COVID-19, Rapid (Ordered)   10/31/22 Rule-Out Test Resulted    COVID-19 (Rule Out) 10/03/22 10/03/22 10/03/22 Respiratory Panel, Molecular, with COVID-19 (Restricted: peds pts or suitable admitted adults) (Ordered)   10/03/22 Rule-Out Test Resulted            Nurse Assessment:  Last Vital Signs: /84   Pulse 73   Temp 97.8 °F (36.6 °C) (Rectal)   Resp 13   Ht 5' 1\" (1.549 m)   Wt 140 lb 10.5 oz (63.8 kg)   SpO2 100%   BMI 26.58 kg/m²     Last documented pain score (0-10 scale): Pain Level: 0  Last Weight:   Wt Readings from Last 1 Encounters:   11/01/22 140 lb 10.5 oz (63.8 kg)     Mental Status:  disoriented - only responds to pain    IV Access:  - None    Nursing Mobility/ADLs:                 - patient bed bound at baseline, only responds to pain   Walking   Dependent  Transfer  Dependent  Bathing  Dependent  Dressing  Dependent  Toileting  Dependent  Feeding  Dependent  Med Admin  Dependent  Med Delivery   crushed via g tube    Wound Care Documentation and Therapy:  Wound 10/31/22 Heel Anterior;Right (Active)   Wound Image   11/01/22 1200   Wound Etiology Deep tissue/Injury 11/01/22 1200   Wound Cleansed Not Cleansed 11/01/22 0415   Dressing/Treatment Barrier film 11/01/22 1200   Wound Length (cm) 3 cm 11/01/22 1200   Wound Width (cm) 4 cm 11/01/22 1200   Wound Depth (cm) 0 cm 11/01/22 1200   Wound Surface Area (cm^2) 12 cm^2 11/01/22 1200   Wound Volume (cm^3) 0 cm^3 11/01/22 1200   Wound Assessment Purple/maroon 11/01/22 1200   Drainage Amount None 11/01/22 0415   Odor None 11/01/22 0415   Carmen-wound Assessment Intact;Dry/flaky 11/01/22 1200   Number of days: 0       Wound 10/31/22 Ear Right (Active)   Wound Cleansed Not Cleansed 11/01/22 0415   Wound Assessment Dry 11/01/22 0415   Drainage Amount None 11/01/22 0415   Odor None 11/01/22 0415   Number of days: 0        Elimination:  Continence: Bowel: No  Bladder: No - nuno catheter   Urinary Catheter:  Insertion Date: 10/31/2022    Colostomy/Ileostomy/Ileal Conduit: No       Date of Last BM: unknown    Intake/Output Summary (Last 24 hours) at 11/1/2022 1245  Last data filed at 11/1/2022 0605  Gross per 24 hour   Intake 1951.07 ml   Output 1405 ml   Net 546.07 ml     I/O last 3 completed shifts: In: 1951.1 [I.V.:1120.7; NG/GT:530; IV Piggyback:300.4]  Out: 0893 [Urine:1405]    Safety Concerns: At Risk for Falls and History of Seizures    Impairments/Disabilities:      Speech, Vision, and Hearing    Nutrition Therapy:  Current Nutrition Therapy:   - Tube Feedings:  Standard with fiber - Jevity 1.5    Routes of Feeding: Gastrostomy Tube  Liquids: No Liquids  Daily Fluid Restriction: no - NPO  Last Modified Barium Swallow with Video (Video Swallowing Test): not done    Treatments at the Time of Hospital Discharge:   Respiratory Treatments: 3 L nasal cannula  Oxygen Therapy:  is on oxygen at 3 L/min per nasal cannula.   Ventilator:    - No ventilator support    Rehab Therapies: N/A  Weight Bearing Status/Restrictions: Non-weight bearing, bed bound  Other Medical Equipment (for information only, NOT a DME order):  N/A  Other Treatments: N/A    Patient's personal belongings (please select all that are sent with patient):  None    RN SIGNATURE:  Electronically signed by Angeles Mensah RN on 11/1/22 at 2:22 PM EDT    CASE MANAGEMENT/SOCIAL WORK SECTION    Inpatient Status Date: 10-    Readmission Risk Assessment Score:  Readmission Risk              Risk of Unplanned Readmission:  26         Discharging to Facility/ Agency   Name: [facility]  Address:  [place] [address]   Phone:  [phone]  Fax:   [phone]      [facility]    / signature: Electronically signed by Halle Varela on 11/1/22 at 1:01 PM EDT    PHYSICIAN SECTION    Prognosis: {Prognosis:2008934752}    Condition at Discharge: [address] [place] [address] Patient Condition:[id number]}    Rehab Potential (if transferring to Rehab): {Prognosis:6396871288}    Recommended Labs or Other Treatments After Discharge: ***    Physician Certification: I certify the above information and transfer of Isaias Archer  is necessary for the continuing treatment of the diagnosis listed and that she requires {Admit to Appropriate Level of Care:47781} for {GREATER/LESS:418552974} 30 days.      Update Admission H&P: {CHP DME Changes in SQLJI:351543480}    PHYSICIAN SIGNATURE:  {Esignature:[id number]}

## 2024-10-02 ENCOUNTER — HOSPITAL ENCOUNTER (INPATIENT)
Age: 89
LOS: 1 days | Discharge: HOME | DRG: 305 | End: 2024-10-04
Attending: EMERGENCY MEDICINE | Admitting: INTERNAL MEDICINE
Payer: MEDICARE

## 2024-10-02 ENCOUNTER — APPOINTMENT (OUTPATIENT)
Dept: GENERAL RADIOLOGY | Age: 89
DRG: 305 | End: 2024-10-02
Payer: MEDICARE

## 2024-10-02 DIAGNOSIS — R07.9 CHEST PAIN, UNSPECIFIED TYPE: Primary | ICD-10-CM

## 2024-10-02 DIAGNOSIS — R01.1 HEART MURMUR: ICD-10-CM

## 2024-10-02 LAB
BASOPHILS # BLD: 0.1 K/UL (ref 0–0.2)
BASOPHILS NFR BLD: 0.9 %
DEPRECATED RDW RBC AUTO: 14.2 % (ref 12.4–15.4)
EOSINOPHIL # BLD: 0.2 K/UL (ref 0–0.6)
EOSINOPHIL NFR BLD: 2.8 %
HCT VFR BLD AUTO: 38.6 % (ref 36–48)
HGB BLD-MCNC: 12.8 G/DL (ref 12–16)
LYMPHOCYTES # BLD: 2.1 K/UL (ref 1–5.1)
LYMPHOCYTES NFR BLD: 34.3 %
MCH RBC QN AUTO: 27.8 PG (ref 26–34)
MCHC RBC AUTO-ENTMCNC: 33.2 G/DL (ref 31–36)
MCV RBC AUTO: 83.7 FL (ref 80–100)
MONOCYTES # BLD: 0.8 K/UL (ref 0–1.3)
MONOCYTES NFR BLD: 12.8 %
NEUTROPHILS # BLD: 3 K/UL (ref 1.7–7.7)
NEUTROPHILS NFR BLD: 49.2 %
PLATELET # BLD AUTO: 301 K/UL (ref 135–450)
PMV BLD AUTO: 9.1 FL (ref 5–10.5)
RBC # BLD AUTO: 4.61 M/UL (ref 4–5.2)
WBC # BLD AUTO: 6.1 K/UL (ref 4–11)

## 2024-10-02 PROCEDURE — 6370000000 HC RX 637 (ALT 250 FOR IP): Performed by: EMERGENCY MEDICINE

## 2024-10-02 PROCEDURE — 96374 THER/PROPH/DIAG INJ IV PUSH: CPT

## 2024-10-02 PROCEDURE — 96375 TX/PRO/DX INJ NEW DRUG ADDON: CPT

## 2024-10-02 PROCEDURE — 83735 ASSAY OF MAGNESIUM: CPT

## 2024-10-02 PROCEDURE — 85025 COMPLETE CBC W/AUTO DIFF WBC: CPT

## 2024-10-02 PROCEDURE — 93005 ELECTROCARDIOGRAM TRACING: CPT | Performed by: EMERGENCY MEDICINE

## 2024-10-02 PROCEDURE — 84100 ASSAY OF PHOSPHORUS: CPT

## 2024-10-02 PROCEDURE — 6360000002 HC RX W HCPCS: Performed by: EMERGENCY MEDICINE

## 2024-10-02 PROCEDURE — 84484 ASSAY OF TROPONIN QUANT: CPT

## 2024-10-02 PROCEDURE — 99285 EMERGENCY DEPT VISIT HI MDM: CPT

## 2024-10-02 PROCEDURE — 71045 X-RAY EXAM CHEST 1 VIEW: CPT

## 2024-10-02 PROCEDURE — 83690 ASSAY OF LIPASE: CPT

## 2024-10-02 PROCEDURE — 80076 HEPATIC FUNCTION PANEL: CPT

## 2024-10-02 PROCEDURE — 80048 BASIC METABOLIC PNL TOTAL CA: CPT

## 2024-10-02 RX ORDER — ONDANSETRON 2 MG/ML
4 INJECTION INTRAMUSCULAR; INTRAVENOUS ONCE
Status: COMPLETED | OUTPATIENT
Start: 2024-10-02 | End: 2024-10-02

## 2024-10-02 RX ORDER — ASPIRIN 81 MG/1
324 TABLET, CHEWABLE ORAL ONCE
Status: DISCONTINUED | OUTPATIENT
Start: 2024-10-02 | End: 2024-10-04 | Stop reason: HOSPADM

## 2024-10-02 RX ADMIN — ONDANSETRON 4 MG: 2 INJECTION INTRAMUSCULAR; INTRAVENOUS at 23:33

## 2024-10-02 ASSESSMENT — ENCOUNTER SYMPTOMS
RESPIRATORY NEGATIVE: 1
NAUSEA: 1
VOMITING: 0
CONSTIPATION: 0
EYES NEGATIVE: 1
ABDOMINAL PAIN: 0
DIARRHEA: 0

## 2024-10-02 ASSESSMENT — PAIN - FUNCTIONAL ASSESSMENT: PAIN_FUNCTIONAL_ASSESSMENT: NONE - DENIES PAIN

## 2024-10-03 ENCOUNTER — APPOINTMENT (OUTPATIENT)
Age: 89
DRG: 305 | End: 2024-10-03
Payer: MEDICARE

## 2024-10-03 PROBLEM — R07.9 CHEST PAIN: Status: ACTIVE | Noted: 2024-10-03

## 2024-10-03 LAB
ALBUMIN SERPL-MCNC: 4.1 G/DL (ref 3.4–5)
ALP SERPL-CCNC: 104 U/L (ref 40–129)
ALT SERPL-CCNC: 15 U/L (ref 10–40)
ANION GAP SERPL CALCULATED.3IONS-SCNC: 12 MMOL/L (ref 3–16)
ANION GAP SERPL CALCULATED.3IONS-SCNC: 7 MMOL/L (ref 3–16)
AST SERPL-CCNC: 26 U/L (ref 15–37)
BILIRUB DIRECT SERPL-MCNC: 0.2 MG/DL (ref 0–0.3)
BILIRUB INDIRECT SERPL-MCNC: 0.4 MG/DL (ref 0–1)
BILIRUB SERPL-MCNC: 0.6 MG/DL (ref 0–1)
BUN SERPL-MCNC: 14 MG/DL (ref 7–20)
BUN SERPL-MCNC: 16 MG/DL (ref 7–20)
CALCIUM SERPL-MCNC: 10.2 MG/DL (ref 8.3–10.6)
CALCIUM SERPL-MCNC: 9.3 MG/DL (ref 8.3–10.6)
CHLORIDE SERPL-SCNC: 101 MMOL/L (ref 99–110)
CHLORIDE SERPL-SCNC: 95 MMOL/L (ref 99–110)
CO2 SERPL-SCNC: 29 MMOL/L (ref 21–32)
CO2 SERPL-SCNC: 29 MMOL/L (ref 21–32)
CREAT SERPL-MCNC: 0.9 MG/DL (ref 0.6–1.2)
CREAT SERPL-MCNC: 0.9 MG/DL (ref 0.6–1.2)
ECHO AO ROOT DIAM: 3.2 CM
ECHO AO ROOT INDEX: 1.86 CM/M2
ECHO AV AREA PEAK VELOCITY: 2.5 CM2
ECHO AV AREA VTI: 2.4 CM2
ECHO AV AREA/BSA PEAK VELOCITY: 1.5 CM2/M2
ECHO AV AREA/BSA VTI: 1.4 CM2/M2
ECHO AV MEAN GRADIENT: 3 MMHG
ECHO AV MEAN VELOCITY: 0.8 M/S
ECHO AV PEAK GRADIENT: 6 MMHG
ECHO AV PEAK VELOCITY: 1.2 M/S
ECHO AV VELOCITY RATIO: 0.75
ECHO AV VTI: 27.4 CM
ECHO BSA: 1.74 M2
ECHO LA AREA 2C: 20.6 CM2
ECHO LA AREA 4C: 17.1 CM2
ECHO LA MAJOR AXIS: 5.2 CM
ECHO LA MINOR AXIS: 6 CM
ECHO LA VOL BP: 54 ML (ref 22–52)
ECHO LA VOL MOD A2C: 59 ML (ref 22–52)
ECHO LA VOL MOD A4C: 44 ML (ref 22–52)
ECHO LA VOL/BSA BIPLANE: 31 ML/M2 (ref 16–34)
ECHO LA VOLUME INDEX MOD A2C: 34 ML/M2 (ref 16–34)
ECHO LA VOLUME INDEX MOD A4C: 26 ML/M2 (ref 16–34)
ECHO LV E' LATERAL VELOCITY: 4.6 CM/S
ECHO LV E' SEPTAL VELOCITY: 4.7 CM/S
ECHO LV EDV 3D: 100 ML
ECHO LV EDV INDEX 3D: 58 ML/M2
ECHO LV EJECTION FRACTION 3D: 59 %
ECHO LV ESV 3D: 41 ML
ECHO LV ESV INDEX 3D: 24 ML/M2
ECHO LV FRACTIONAL SHORTENING: 13 % (ref 28–44)
ECHO LV GLOBAL LONGITUDINAL STRAIN (GLS): -17.1 %
ECHO LV INTERNAL DIMENSION DIASTOLE INDEX: 1.74 CM/M2
ECHO LV INTERNAL DIMENSION DIASTOLIC: 3 CM (ref 3.9–5.3)
ECHO LV INTERNAL DIMENSION SYSTOLIC INDEX: 1.51 CM/M2
ECHO LV INTERNAL DIMENSION SYSTOLIC: 2.6 CM
ECHO LV IVSD: 1.3 CM (ref 0.6–0.9)
ECHO LV MASS 2D: 109.1 G (ref 67–162)
ECHO LV MASS 3D INDEX: 80.8 G/M2
ECHO LV MASS 3D: 139 G
ECHO LV MASS INDEX 2D: 63.5 G/M2 (ref 43–95)
ECHO LV POSTERIOR WALL DIASTOLIC: 1.1 CM (ref 0.6–0.9)
ECHO LV RELATIVE WALL THICKNESS RATIO: 0.73
ECHO LVOT AREA: 3.1 CM2
ECHO LVOT AV VTI INDEX: 0.78
ECHO LVOT DIAM: 2 CM
ECHO LVOT MEAN GRADIENT: 2 MMHG
ECHO LVOT PEAK GRADIENT: 3 MMHG
ECHO LVOT PEAK VELOCITY: 0.9 M/S
ECHO LVOT STROKE VOLUME INDEX: 38.9 ML/M2
ECHO LVOT SV: 66.9 ML
ECHO LVOT VTI: 21.3 CM
ECHO MV A VELOCITY: 1.07 M/S
ECHO MV AREA VTI: 2.1 CM2
ECHO MV E DECELERATION TIME (DT): 264 MS
ECHO MV E VELOCITY: 0.96 M/S
ECHO MV E/A RATIO: 0.9
ECHO MV E/E' LATERAL: 20.87
ECHO MV E/E' RATIO (AVERAGED): 20.65
ECHO MV E/E' SEPTAL: 20.43
ECHO MV LVOT VTI INDEX: 1.46
ECHO MV MAX VELOCITY: 1.1 M/S
ECHO MV MEAN GRADIENT: 2 MMHG
ECHO MV MEAN VELOCITY: 0.6 M/S
ECHO MV PEAK GRADIENT: 5 MMHG
ECHO MV VTI: 31.2 CM
ECHO PULMONARY ARTERY END DIASTOLIC PRESSURE: 7 MMHG
ECHO PV MAX VELOCITY: 0.9 M/S
ECHO PV PEAK GRADIENT: 3 MMHG
ECHO PV REGURGITANT MAX VELOCITY: 1.3 M/S
ECHO RA AREA 4C: 15 CM2
ECHO RA END SYSTOLIC VOLUME APICAL 4 CHAMBER INDEX BSA: 22 ML/M2
ECHO RA VOLUME: 37 ML
ECHO RV BASAL DIMENSION: 3.9 CM
ECHO RV FREE WALL PEAK S': 12.2 CM/S
ECHO RV LONGITUDINAL DIMENSION: 7.5 CM
ECHO RV MID DIMENSION: 2.8 CM
ECHO RV TAPSE: 2.8 CM (ref 1.7–?)
ECHO TV REGURGITANT MAX VELOCITY: 2.68 M/S
ECHO TV REGURGITANT PEAK GRADIENT: 29 MMHG
EKG ATRIAL RATE: 67 BPM
EKG DIAGNOSIS: NORMAL
EKG P AXIS: 107 DEGREES
EKG P-R INTERVAL: 234 MS
EKG Q-T INTERVAL: 392 MS
EKG QRS DURATION: 78 MS
EKG QTC CALCULATION (BAZETT): 414 MS
EKG R AXIS: 6 DEGREES
EKG T AXIS: 41 DEGREES
EKG VENTRICULAR RATE: 67 BPM
GFR SERPLBLD CREATININE-BSD FMLA CKD-EPI: 59 ML/MIN/{1.73_M2}
GFR SERPLBLD CREATININE-BSD FMLA CKD-EPI: 59 ML/MIN/{1.73_M2}
GLUCOSE SERPL-MCNC: 104 MG/DL (ref 70–99)
GLUCOSE SERPL-MCNC: 110 MG/DL (ref 70–99)
LIPASE SERPL-CCNC: 33 U/L (ref 13–60)
MAGNESIUM SERPL-MCNC: 1.7 MG/DL (ref 1.8–2.4)
PHOSPHATE SERPL-MCNC: 3 MG/DL (ref 2.5–4.9)
POTASSIUM SERPL-SCNC: 3.1 MMOL/L (ref 3.5–5.1)
POTASSIUM SERPL-SCNC: 3.7 MMOL/L (ref 3.5–5.1)
PROT SERPL-MCNC: 7.9 G/DL (ref 6.4–8.2)
SODIUM SERPL-SCNC: 136 MMOL/L (ref 136–145)
SODIUM SERPL-SCNC: 137 MMOL/L (ref 136–145)
TROPONIN, HIGH SENSITIVITY: 10 NG/L (ref 0–14)
TROPONIN, HIGH SENSITIVITY: 8 NG/L (ref 0–14)

## 2024-10-03 PROCEDURE — 6370000000 HC RX 637 (ALT 250 FOR IP): Performed by: INTERNAL MEDICINE

## 2024-10-03 PROCEDURE — 80048 BASIC METABOLIC PNL TOTAL CA: CPT

## 2024-10-03 PROCEDURE — 2580000003 HC RX 258

## 2024-10-03 PROCEDURE — 97116 GAIT TRAINING THERAPY: CPT

## 2024-10-03 PROCEDURE — 6360000002 HC RX W HCPCS

## 2024-10-03 PROCEDURE — 96375 TX/PRO/DX INJ NEW DRUG ADDON: CPT

## 2024-10-03 PROCEDURE — 6370000000 HC RX 637 (ALT 250 FOR IP)

## 2024-10-03 PROCEDURE — 97530 THERAPEUTIC ACTIVITIES: CPT

## 2024-10-03 PROCEDURE — G0378 HOSPITAL OBSERVATION PER HR: HCPCS

## 2024-10-03 PROCEDURE — 76376 3D RENDER W/INTRP POSTPROCES: CPT

## 2024-10-03 PROCEDURE — 76376 3D RENDER W/INTRP POSTPROCES: CPT | Performed by: INTERNAL MEDICINE

## 2024-10-03 PROCEDURE — 84484 ASSAY OF TROPONIN QUANT: CPT

## 2024-10-03 PROCEDURE — 96372 THER/PROPH/DIAG INJ SC/IM: CPT

## 2024-10-03 PROCEDURE — 6360000002 HC RX W HCPCS: Performed by: EMERGENCY MEDICINE

## 2024-10-03 PROCEDURE — 96365 THER/PROPH/DIAG IV INF INIT: CPT

## 2024-10-03 PROCEDURE — 96376 TX/PRO/DX INJ SAME DRUG ADON: CPT

## 2024-10-03 PROCEDURE — 97162 PT EVAL MOD COMPLEX 30 MIN: CPT

## 2024-10-03 PROCEDURE — 93306 TTE W/DOPPLER COMPLETE: CPT | Performed by: INTERNAL MEDICINE

## 2024-10-03 PROCEDURE — 1200000000 HC SEMI PRIVATE

## 2024-10-03 PROCEDURE — 97166 OT EVAL MOD COMPLEX 45 MIN: CPT

## 2024-10-03 PROCEDURE — 6370000000 HC RX 637 (ALT 250 FOR IP): Performed by: EMERGENCY MEDICINE

## 2024-10-03 PROCEDURE — 93356 MYOCRD STRAIN IMG SPCKL TRCK: CPT | Performed by: INTERNAL MEDICINE

## 2024-10-03 PROCEDURE — 97535 SELF CARE MNGMENT TRAINING: CPT

## 2024-10-03 PROCEDURE — 99222 1ST HOSP IP/OBS MODERATE 55: CPT | Performed by: INTERNAL MEDICINE

## 2024-10-03 PROCEDURE — 36415 COLL VENOUS BLD VENIPUNCTURE: CPT

## 2024-10-03 RX ORDER — LISINOPRIL 10 MG/1
10 TABLET ORAL DAILY
Status: DISCONTINUED | OUTPATIENT
Start: 2024-10-03 | End: 2024-10-03

## 2024-10-03 RX ORDER — POTASSIUM CHLORIDE 1500 MG/1
20 TABLET, EXTENDED RELEASE ORAL DAILY
Status: DISCONTINUED | OUTPATIENT
Start: 2024-10-03 | End: 2024-10-03

## 2024-10-03 RX ORDER — SODIUM CHLORIDE 0.9 % (FLUSH) 0.9 %
5-40 SYRINGE (ML) INJECTION PRN
Status: DISCONTINUED | OUTPATIENT
Start: 2024-10-03 | End: 2024-10-04 | Stop reason: HOSPADM

## 2024-10-03 RX ORDER — ACETAMINOPHEN 325 MG/1
650 TABLET ORAL EVERY 6 HOURS PRN
Status: DISCONTINUED | OUTPATIENT
Start: 2024-10-03 | End: 2024-10-04 | Stop reason: HOSPADM

## 2024-10-03 RX ORDER — ATORVASTATIN CALCIUM 80 MG/1
80 TABLET, FILM COATED ORAL DAILY
Status: DISCONTINUED | OUTPATIENT
Start: 2024-10-03 | End: 2024-10-04 | Stop reason: HOSPADM

## 2024-10-03 RX ORDER — ENOXAPARIN SODIUM 100 MG/ML
40 INJECTION SUBCUTANEOUS DAILY
Status: DISCONTINUED | OUTPATIENT
Start: 2024-10-03 | End: 2024-10-04 | Stop reason: HOSPADM

## 2024-10-03 RX ORDER — HYDRALAZINE HYDROCHLORIDE 20 MG/ML
10 INJECTION INTRAMUSCULAR; INTRAVENOUS EVERY 6 HOURS PRN
Status: DISCONTINUED | OUTPATIENT
Start: 2024-10-03 | End: 2024-10-04 | Stop reason: HOSPADM

## 2024-10-03 RX ORDER — SODIUM CHLORIDE 9 MG/ML
INJECTION, SOLUTION INTRAVENOUS PRN
Status: DISCONTINUED | OUTPATIENT
Start: 2024-10-03 | End: 2024-10-04 | Stop reason: HOSPADM

## 2024-10-03 RX ORDER — VALSARTAN 160 MG/1
160 TABLET ORAL NIGHTLY
Status: DISCONTINUED | OUTPATIENT
Start: 2024-10-03 | End: 2024-10-04 | Stop reason: HOSPADM

## 2024-10-03 RX ORDER — POTASSIUM CHLORIDE 1500 MG/1
40 TABLET, EXTENDED RELEASE ORAL ONCE
Status: DISCONTINUED | OUTPATIENT
Start: 2024-10-03 | End: 2024-10-03

## 2024-10-03 RX ORDER — LISINOPRIL/HYDROCHLOROTHIAZIDE 10-12.5 MG
1 TABLET ORAL DAILY
Status: DISCONTINUED | OUTPATIENT
Start: 2024-10-03 | End: 2024-10-03 | Stop reason: CLARIF

## 2024-10-03 RX ORDER — ASPIRIN 81 MG/1
81 TABLET ORAL DAILY
Status: DISCONTINUED | OUTPATIENT
Start: 2024-10-03 | End: 2024-10-04 | Stop reason: HOSPADM

## 2024-10-03 RX ORDER — HYDRALAZINE HYDROCHLORIDE 20 MG/ML
10 INJECTION INTRAMUSCULAR; INTRAVENOUS EVERY 6 HOURS PRN
Status: DISCONTINUED | OUTPATIENT
Start: 2024-10-03 | End: 2024-10-03

## 2024-10-03 RX ORDER — ACETAMINOPHEN 650 MG/1
650 SUPPOSITORY RECTAL EVERY 6 HOURS PRN
Status: DISCONTINUED | OUTPATIENT
Start: 2024-10-03 | End: 2024-10-04 | Stop reason: HOSPADM

## 2024-10-03 RX ORDER — POLYETHYLENE GLYCOL 3350 17 G/17G
17 POWDER, FOR SOLUTION ORAL DAILY PRN
Status: DISCONTINUED | OUTPATIENT
Start: 2024-10-03 | End: 2024-10-04 | Stop reason: HOSPADM

## 2024-10-03 RX ORDER — SODIUM CHLORIDE 0.9 % (FLUSH) 0.9 %
5-40 SYRINGE (ML) INJECTION EVERY 12 HOURS SCHEDULED
Status: DISCONTINUED | OUTPATIENT
Start: 2024-10-03 | End: 2024-10-04 | Stop reason: HOSPADM

## 2024-10-03 RX ORDER — ISOSORBIDE MONONITRATE 30 MG/1
30 TABLET, EXTENDED RELEASE ORAL DAILY
Status: DISCONTINUED | OUTPATIENT
Start: 2024-10-03 | End: 2024-10-04 | Stop reason: HOSPADM

## 2024-10-03 RX ORDER — ONDANSETRON 4 MG/1
4 TABLET, ORALLY DISINTEGRATING ORAL EVERY 8 HOURS PRN
Status: DISCONTINUED | OUTPATIENT
Start: 2024-10-03 | End: 2024-10-04 | Stop reason: HOSPADM

## 2024-10-03 RX ORDER — ONDANSETRON 2 MG/ML
4 INJECTION INTRAMUSCULAR; INTRAVENOUS EVERY 6 HOURS PRN
Status: DISCONTINUED | OUTPATIENT
Start: 2024-10-03 | End: 2024-10-04 | Stop reason: HOSPADM

## 2024-10-03 RX ORDER — ATENOLOL 50 MG/1
150 TABLET ORAL DAILY
Status: DISCONTINUED | OUTPATIENT
Start: 2024-10-03 | End: 2024-10-04 | Stop reason: HOSPADM

## 2024-10-03 RX ORDER — MAGNESIUM SULFATE 1 G/100ML
1000 INJECTION INTRAVENOUS ONCE
Status: COMPLETED | OUTPATIENT
Start: 2024-10-03 | End: 2024-10-03

## 2024-10-03 RX ORDER — HYDROCHLOROTHIAZIDE 25 MG/1
12.5 TABLET ORAL DAILY
Status: DISCONTINUED | OUTPATIENT
Start: 2024-10-03 | End: 2024-10-03

## 2024-10-03 RX ORDER — POTASSIUM CHLORIDE 7.45 MG/ML
10 INJECTION INTRAVENOUS
Status: COMPLETED | OUTPATIENT
Start: 2024-10-03 | End: 2024-10-03

## 2024-10-03 RX ADMIN — HYDROCHLOROTHIAZIDE 12.5 MG: 25 TABLET ORAL at 09:58

## 2024-10-03 RX ADMIN — VALSARTAN 160 MG: 160 TABLET, FILM COATED ORAL at 20:53

## 2024-10-03 RX ADMIN — HYDRALAZINE HYDROCHLORIDE 10 MG: 20 INJECTION, SOLUTION INTRAMUSCULAR; INTRAVENOUS at 04:52

## 2024-10-03 RX ADMIN — ATENOLOL 150 MG: 50 TABLET ORAL at 09:58

## 2024-10-03 RX ADMIN — POTASSIUM CHLORIDE 10 MEQ: 10 INJECTION, SOLUTION INTRAVENOUS at 04:07

## 2024-10-03 RX ADMIN — ACETAMINOPHEN 650 MG: 325 TABLET ORAL at 20:53

## 2024-10-03 RX ADMIN — SODIUM CHLORIDE, PRESERVATIVE FREE 10 ML: 5 INJECTION INTRAVENOUS at 10:00

## 2024-10-03 RX ADMIN — ATORVASTATIN CALCIUM 80 MG: 80 TABLET, FILM COATED ORAL at 09:58

## 2024-10-03 RX ADMIN — ISOSORBIDE MONONITRATE 30 MG: 30 TABLET, EXTENDED RELEASE ORAL at 15:38

## 2024-10-03 RX ADMIN — POTASSIUM CHLORIDE 10 MEQ: 10 INJECTION, SOLUTION INTRAVENOUS at 02:58

## 2024-10-03 RX ADMIN — ASPIRIN 81 MG: 81 TABLET, COATED ORAL at 09:58

## 2024-10-03 RX ADMIN — POTASSIUM CHLORIDE 10 MEQ: 10 INJECTION, SOLUTION INTRAVENOUS at 01:33

## 2024-10-03 RX ADMIN — ENOXAPARIN SODIUM 40 MG: 100 INJECTION SUBCUTANEOUS at 09:57

## 2024-10-03 RX ADMIN — POTASSIUM BICARBONATE 20 MEQ: 782 TABLET, EFFERVESCENT ORAL at 09:58

## 2024-10-03 RX ADMIN — LISINOPRIL 10 MG: 10 TABLET ORAL at 09:58

## 2024-10-03 RX ADMIN — SODIUM CHLORIDE, PRESERVATIVE FREE 10 ML: 5 INJECTION INTRAVENOUS at 20:54

## 2024-10-03 RX ADMIN — MAGNESIUM SULFATE HEPTAHYDRATE 1000 MG: 1 INJECTION, SOLUTION INTRAVENOUS at 01:16

## 2024-10-03 RX ADMIN — POTASSIUM CHLORIDE 10 MEQ: 10 INJECTION, SOLUTION INTRAVENOUS at 05:13

## 2024-10-03 ASSESSMENT — PAIN - FUNCTIONAL ASSESSMENT: PAIN_FUNCTIONAL_ASSESSMENT: ACTIVITIES ARE NOT PREVENTED

## 2024-10-03 ASSESSMENT — ENCOUNTER SYMPTOMS
WHEEZING: 0
SHORTNESS OF BREATH: 0
APNEA: 0
EYE PAIN: 1
COUGH: 0
CHOKING: 0
CHEST TIGHTNESS: 1
NAUSEA: 1
STRIDOR: 0

## 2024-10-03 ASSESSMENT — PAIN SCALES - GENERAL: PAINLEVEL_OUTOF10: 2

## 2024-10-03 ASSESSMENT — PAIN DESCRIPTION - ORIENTATION: ORIENTATION: RIGHT;LEFT

## 2024-10-03 ASSESSMENT — PAIN DESCRIPTION - DESCRIPTORS: DESCRIPTORS: ACHING

## 2024-10-03 ASSESSMENT — PAIN DESCRIPTION - LOCATION: LOCATION: EYE

## 2024-10-03 ASSESSMENT — PAIN DESCRIPTION - PAIN TYPE: TYPE: ACUTE PAIN

## 2024-10-03 ASSESSMENT — PAIN DESCRIPTION - FREQUENCY: FREQUENCY: INTERMITTENT

## 2024-10-03 NOTE — DISCHARGE SUMMARY
INTERNAL MEDICINE DEPARTMENT AT THE [facility]  DISCHARGE SUMMARY    Patient ID: Lin Herr                                             Discharge Date: 10/4/2024   Patient's PCP: Johanny Daniels MD                                          Discharge Physician: Rodrigo Trivedi MD   Admit Date: 10/2/2024   Admitting Physician: You Cleaning MD    PROBLEMS DURING HOSPITALIZATION:  Present on Admission:   Chest pain      DISCHARGE DIAGNOSES: stable Angina    HPI:  89-year-old female with past medical history of essential hypertension, stable angina presented to the emergency department because of chest discomfort that started 5 hours ago when she was sweeping her floor with a broom stick.  Usually she gets similar chest discomfort which responds well to rest and nitroglycerin.  She stopped sweeping her floor took a break but that did not relieve the chest discomfort so she took her nitroglycerin tablet which improved the discomfort but not completely.  The pain was pressure-like in nature and did not radiate anywhere. the chest discomfort was associated with nausea but no vomiting.  She did not have palpitation, shortness of breath, dizziness, lightheadedness and syncope.      In the ED she was hypertensive but otherwise stable.  Well-oriented x 4.  Chest x-ray did not show any acute abnormality.  EKG did not show any acute ischemic changes.  Troponins were negative x 2.  The only thing remarkable in her lab was hypokalemia and hypomagnesemia.      She follows up with a cardiologist and is on aspirin, statin and nitro for CAD with only ischemic workup that I could find in the record was a stress test from 2004 which was decreased focal for any ischemic changes.      The following issues were addressed during hospitalization:    Stable Angina  Patient presented with chest pain notes mostly improved after sublingual nitroglycerin administration at home.  Has known history of stable angina, and chest pain began  on exertion.  She had no clinical markers suggestive of acute coronary syndrome: Troponin was 10, then 8 on repeat.  Chest x-ray was within normal, and EKG was largely unremarkable aside from a new first-degree AV block-otherwise she was in sinus rhythm.  Patient was notably hypertensive on admission, up to 194/73.  No overt symptoms such as headache or visual changes noted.  Her home blood pressure medications were restarted and hydralazine initiated as needed.  She had an uneventful night, and upon assessment the morning of 10/03 she had no chest pain and reported feeling as per baseline.  Cardiologist Dr. Ross had long discussion with patient regarding possible stress test and performing angiography, to which patient preferred to optimize medical management and did not wish to proceed with more invasive testing.  Echocardiography performed 10/03 was largely unremarkable without significant valvular abnormalities and EF 60%. She had no chest pain over hospitalization. Patient was cleared for discharge 10/04 with low suspicion of acute coronary syndrome.       The following medication changes were made:     -STOP Lisinopril-Hydrochlorothiazide 10-12.5 tab ONCE DAILY  -STOP POTASSIUM CHLORIDE 20mEq tab ONCE DAILY   -STOP Hydralazine 10mg THREE TIMES DAILY     -START Bidil (Isosorbide Hydralazine 20-37.5mg) 1 TAB TWICE DAILY   -START Diovan (Valsartan 160mg) 1 tab ONCE NIGHTLY    -START Zofran 4mg (1 tab) as needed for nausea three times daily    Patient was recommended to follow-up with PCP within a week.     Physical Exam:  BP (!) 181/72   Pulse 69   Temp 97.8 °F (36.6 °C) (Oral)   Resp 18   Ht 1.626 m (5' 4\")   Wt 67.1 kg (148 lb)   SpO2 96%   BMI 25.40 kg/m²     Vitals reviewed.   Constitutional:       Appearance: Normal appearance.   HENT:      Head: Normocephalic and atraumatic.      Nose: Nose normal.      Mouth/Throat:      Mouth: Mucous membranes are moist.      Pharynx: Oropharynx is clear.   Eyes:

## 2024-10-03 NOTE — PROGRESS NOTES
Internal Medicine Progress Note    Patient Name: Lin Herr   Patient : 1931   Date: 10/3/2024   Admit Date: 10/2/2024     CC: Chest Pain and Nausea       Interval History     S: Patient describes that chest pain started yesterday 10/02 while she was sweeping the floor.  Initially the pain was 7 out of 10, accompanied by a fluttering sensation-possibly palpitations.  After sublingual nitroglycerin chest pain went down to 2 out of 10, but increased slightly after she spoke to her daughter over the phone.  This prompted ED visit.  No chest pain overnight, since admission.  She does describe ongoing hypertension at home, often has readings >160 systolic and has periods of lightheadedness/ vertigo with visual changes.   O:  -195, Tmax 37, O2 96% on RA, new 1st degree AV block on EKG otherwise in sinus rhythm. Physical exam benign  Labs: potassium 3.7, WBC 6.1, HgB 12.8  A/P: discussion held with cardiology-stress test would not be productive as she would not want to undergo catherization if there is still room in medical management. Will perform echo and take pt off NPO.     ROS   Review of Systems   Respiratory:  Positive for chest tightness.    Cardiovascular:  Positive for chest pain and palpitations.   All other systems reviewed and are negative.         Objective     I/Os:  No intake/output data recorded.      Vital Signs:  Patient Vitals for the past 8 hrs:   BP Temp Temp src Pulse Resp SpO2   10/03/24 0516 (!) 161/78 -- -- -- -- --   10/03/24 0452 (!) 177/74 -- -- -- -- --   10/03/24 0412 (!) 187/77 -- -- -- -- --   10/03/24 0251 (!) 181/74 98.6 °F (37 °C) Oral 62 14 96 %   10/03/24 0130 (!) 166/65 -- -- 69 11 99 %       Physical Exam:  Physical Exam  Vitals reviewed.   Constitutional:       Appearance: Normal appearance.   HENT:      Head: Normocephalic and atraumatic.      Nose: Nose normal.      Mouth/Throat:      Mouth: Mucous membranes are moist.      Pharynx: Oropharynx is clear.    Eyes:      Extraocular Movements: Extraocular movements intact.      Conjunctiva/sclera: Conjunctivae normal.   Cardiovascular:      Rate and Rhythm: Normal rate and regular rhythm.      Pulses: Normal pulses.      Heart sounds: Normal heart sounds.   Pulmonary:      Effort: Pulmonary effort is normal.      Breath sounds: Normal breath sounds.   Abdominal:      General: Abdomen is flat. Bowel sounds are normal.      Palpations: Abdomen is soft.   Musculoskeletal:         General: Normal range of motion.      Cervical back: Normal range of motion.   Skin:     General: Skin is warm and dry.      Capillary Refill: Capillary refill takes less than 2 seconds.   Neurological:      General: No focal deficit present.      Mental Status: She is alert and oriented to person, place, and time.   Psychiatric:         Mood and Affect: Mood normal.         Medications:   aspirin  81 mg Oral Daily    atenolol  150 mg Oral Daily    atorvastatin  80 mg Oral Daily    sodium chloride flush  5-40 mL IntraVENous 2 times per day    enoxaparin  40 mg SubCUTAneous Daily    lisinopril  10 mg Oral Daily    hydroCHLOROthiazide  12.5 mg Oral Daily    potassium bicarb-citric acid  20 mEq Oral Once    aspirin  324 mg Oral Once       sodium chloride        sodium chloride flush, sodium chloride, ondansetron **OR** ondansetron, polyethylene glycol, acetaminophen **OR** acetaminophen, hydrALAZINE     Labs:  CBC:   Recent Labs     10/02/24  2332   WBC 6.1   HGB 12.8   HCT 38.6      MCV 83.7       Renal:    Recent Labs     10/02/24  2332 10/03/24  0509    137   K 3.1* 3.7   CL 95* 101   CO2 29 29   BUN 16 14   CREATININE 0.9 0.9   GLUCOSE 110* 104*   CALCIUM 10.2 9.3   MG 1.70*  --    PHOS 3.0  --    ANIONGAP 12 7       Hepatic:   Recent Labs     10/02/24  2332   AST 26   ALT 15   BILITOT 0.6   BILIDIR 0.2   ALKPHOS 104       Troponin:   Recent Labs     10/02/24  2332 10/03/24  0024   TROPHS 10 8         Pro-BNP: No results for

## 2024-10-03 NOTE — PROGRESS NOTES
Received pt from ED to room 6368. A/OX4. Oriented to staff, call light and room. Belongings with pt at bedside.

## 2024-10-03 NOTE — PROGRESS NOTES
Physical Therapy  Facility/Department: [address]  Physical Therapy Initial Assessment/Treatment    Name: Lin Herr  : 1931  MRN: [id number]  Date of Service: 10/3/2024    Discharge Recommendations:  24 hour supervision or assist, Home with Home health PT   PT Equipment Recommendations  Equipment Needed: No      Patient Diagnosis(es): The primary encounter diagnosis was Chest pain, unspecified type. A diagnosis of Heart murmur was also pertinent to this visit.  Past Medical History:  has a past medical history of Hyperlipidemia and Hypertension.  Past Surgical History:  has no past surgical history on file.    Assessment  Assessment: 89 yo adm with chest pain & nausea.  Pt having no chest pain today & was able to ambulate in room/prieto with rolling walker.  Pt typically uses a walker at home & states she has had trouble with R hip for awhile.  Demo B hip weakness & could benefit from short bout of home PT for strengthening.  Pt has had 2 falls in past year.  Will follow while here.  Recommend home with 24 hr A initially & home PT.  Daughter able to provide 24 hr A.  Treatment Diagnosis: impaired mobility  Therapy Prognosis: Good  Decision Making: Medium Complexity  Requires PT Follow-Up: Yes  Activity Tolerance  Activity Tolerance: Patient tolerated evaluation without incident    Plan  Physical Therapy Plan  General Plan:  (2-5)  Current Treatment Recommendations: Functional mobility training, Balance training, Gait training, Stair training, Transfer training, Safety education & training, Home exercise program, Strengthening, ROM, Patient/Caregiver education & training  Safety Devices  Type of Devices: Call light within reach, Chair alarm in place, Nurse notified, Left in chair    Restrictions  Position Activity Restriction  Other position/activity restrictions: up as tolerated     Subjective  General  Chart Reviewed: Yes  Patient assessed for rehabilitation services?: Yes  Additional  Minutes:   25    Total Treatment Minutes:  41         Beatriz Sarkar, PT [id number]

## 2024-10-03 NOTE — PROGRESS NOTES
Occupational Therapy  Facility/Department: [address]  Occupational Therapy Initial Assessment/Treatment/Discharge Summary    Name: Lin Herr  : 1931  MRN: [id number]  Date of Service: 10/3/2024    Discharge Recommendations:  24 hour supervision or assist  OT Equipment Recommendations  Equipment Needed: No       Patient Diagnosis(es): The primary encounter diagnosis was Chest pain, unspecified type. A diagnosis of Heart murmur was also pertinent to this visit.  Past Medical History:  has a past medical history of Hyperlipidemia and Hypertension.  Past Surgical History:  has no past surgical history on file.    Treatment Diagnosis: impaired ADLs/transfers      Assessment  Assessment: Pt presents to [facility] with chest pain and nausea- pt reporing symptoms have since improved. Pt from home, lives with daughter and reporting independence with ADLs/transfers with use of RW at baseline. During evalation, pt completes toileting, toilet transfers, grooming and ambulation (household distances) with RW and SBA-supv. Pt demo's and reports being at/close to functional baseline. Pt with good tolerance to activity and denied any symptoms/pain. Recommend home with 24 hr supervision/assist at d/c. Will sign-off.  Treatment Diagnosis: impaired ADLs/transfers  No Skilled OT: Independent with ADL's;Safe to return home;No OT goals identified  REQUIRES OT FOLLOW-UP: No  Activity Tolerance  Activity Tolerance: Patient Tolerated treatment well     Plan  Occupational Therapy Plan  Times Per Week: initial d/c    Restrictions  Position Activity Restriction  Other position/activity restrictions: up as tolerated    Subjective  General  Chart Reviewed: Yes, Orders  Patient assessed for rehabilitation services?: Yes  Additional Pertinent Hx: Lin Herr is a 89 y.o. female with history of hypertension, hyperlipidemia, and coronary artery disease presents complaining of chest pain.  Patient notes intermittent chest pains  RW; home therapies for LB weakness  Education Method: Verbal  Barriers to Learning: None  Education Outcome: Verbalized understanding     AM-PAC - ADL  AM-PAC Daily Activity - Inpatient   How much help is needed for putting on and taking off regular lower body clothing?: None  How much help is needed for bathing (which includes washing, rinsing, drying)?: None  How much help is needed for toileting (which includes using toilet, bedpan, or urinal)?: None  How much help is needed for putting on and taking off regular upper body clothing?: None  How much help is needed for taking care of personal grooming?: None  How much help for eating meals?: None  AM-PAC Inpatient Daily Activity Raw Score: 24  AM-PAC Inpatient ADL T-Scale Score : 57.54  ADL Inpatient CMS 0-100% Score: 0  ADL Inpatient CMS G-Code Modifier : CH    Goals  Short Term Goals  Time Frame for Short Term Goals: No goals identified- pt at functional baseline      Therapy Time   Individual Concurrent Group Co-treatment   Time In 0911         Time Out 0952         Minutes 41         Timed Code Treatment Minutes: 31 Minutes (+10 min eval)   Total Treatment Minutes: 41 Minutes    Chris Garcia, OT

## 2024-10-03 NOTE — H&P
Internal Medicine H&P    Date: 10/3/2024   Patient: Lin Herr   Patient : 1931     CC: Chest Pain and Nausea       Subjective     HPI:   89-year-old female with past medical history of essential hypertension, stable angina presented to the emergency department because of chest discomfort that started 5 hours ago when she was sweeping her floor with a broom stick.  Usually she gets similar chest discomfort which responds well to rest and nitroglycerin.  She stopped sweeping her floor took a break but that did not relieve the chest discomfort so she took her nitroglycerin tablet which improved the discomfort but not completely.  The pain was pressure-like in nature and did not radiate anywhere. the chest discomfort was associated with nausea but no vomiting.  She did not have palpitation, shortness of breath, dizziness, lightheadedness and syncope.     In the ED she was hypertensive but otherwise stable.  Well-oriented x 4.  Chest x-ray did not show any acute abnormality.  EKG did not show any acute ischemic changes.  Troponins were negative x 2.  The only thing remarkable in her lab was hypokalemia and hypomagnesemia.     She follows up with a cardiologist and is on aspirin, statin and nitro for CAD with only ischemic workup that I could find in the record was a stress test from  which was decreased focal for any ischemic changes.    PMHx:      Diagnosis Date    Hyperlipidemia     Hypertension        PSurgHx:  History reviewed. No pertinent surgical history.     Medication:  Medications Prior to Admission: lisinopril-hydroCHLOROthiazide (PRINZIDE;ZESTORETIC) 10-12.5 MG per tablet, Take 1 tablet by mouth daily  potassium chloride (KLOR-CON M) 20 MEQ extended release tablet, Take 1 tablet by mouth daily (Patient not taking: Reported on 10/2/2024)  atenolol (TENORMIN) 100 MG tablet, Take 150 mg by mouth daily  aspirin 81 MG EC tablet, Take 81 mg by mouth daily  atorvastatin (LIPITOR) 80 MG tablet,

## 2024-10-03 NOTE — ED NOTES
How does patient ambulate?   []Low Fall Risk (ambulates by themselves without support)  [x]Stand by assist   []Contact Guard   []Front wheel walker  []Wheelchair   []Steady  []Bed bound  []History of Lower Extremity Amputation  []Unknown, did not assess in the emergency department   How does patient take pills?  [x]Whole with Water  [x]Crushed in applesauce  []Crushed in pudding  []Other  []Unknown no oral medications were given in the ED  Is patient alert?   [x]Alert  []Drowsy but responds to voice  []Doesn't respond to voice but responds to painful stimuli  []Unresponsive  Is patient oriented?   [x]To person  [x]To place  [x]To time  [x]To situation  []Confused  []Agitated  []Follows commands  If patient is disoriented or from a Skill Nursing Facility has family been notified of admission?   []Yes   [x]No  Patient belongings?   [x]Cell phone  [x]Wallet   [x]Dentures  []Clothing  Any specific patient or family belongings/needs/dynamics?   None  Miscellaneous comments/pending orders?  None     If there are any additional questions please reach out to the Emergency Department.           Alexis Rucker RN  10/03/24 0155

## 2024-10-03 NOTE — PLAN OF CARE
Problem: Safety - Adult  Goal: Free from fall injury  Outcome: Progressing  Flowsheets (Taken 10/3/2024 0411)  Free From Fall Injury:   Instruct family/caregiver on patient safety   Based on caregiver fall risk screen, instruct family/caregiver to ask for assistance with transferring infant if caregiver noted to have fall risk factors     Problem: ABCDS Injury Assessment  Goal: Absence of physical injury  Outcome: Progressing  Flowsheets (Taken 10/3/2024 0411)  Absence of Physical Injury: Implement safety measures based on patient assessment

## 2024-10-03 NOTE — CONSULTS
[facility]  H+P  Consult  OP Visit  FU Visit   CC/HPI   CC New patient visit for cp.   HPI 89 y.o., female admitted with chest symptoms.  Reports chest feels \"fast\" at times.  No significant chest pressure.  Intermittent sob.  Fast sensation radiates into abdomen.  SOB with mild activity.  Follows with cardiologist and medically managing chronic angina.   Cardiac Hx None   CARDIAC TESTING   EKG NSR, No ischemia/injury   Troponin Lab Results   Component Value Date    TROPHS 8 10/03/2024    TROPHS 10 10/02/2024      HISTORY/ALLERGY/ROS   MEDHx  has a past medical history of Hyperlipidemia and Hypertension.   SURGHx  has no past surgical history on file.   SOCHx  reports that she has never smoked. She has never used smokeless tobacco. She reports that she does not currently use alcohol. She reports that she does not currently use drugs.   FAMHx family history is not on file.   ALLERG Patient has no known allergies.   ROS Full ROS obtained and negative except as mentioned in HPI   MEDICATIONS   Medications reviewed in Epic.   PHYSICAL EXAM   Vitals BP (!) 161/78   Pulse 62   Temp 98.6 °F (37 °C) (Oral)   Resp 14   Ht 1.626 m (5' 4\")   Wt 67.1 kg (148 lb)   SpO2 96%   BMI 25.40 kg/m²    Gen Alert, coop, no distress Heart  RRR, no MRG   Head NC, AT, no abnorm Abd  Soft, NT, +BS, no mass, no OM   Eyes PER, conj/corn clear Ext  Ext nl, AT, no C/C/E   Nose Nares nl, no drain, NT Pulse 2+ and symmetric   Throat Lips, mucosa, tongue nl Skin Col/text/turg nl, no vis rash/les   Neck S/S, TM, NT, no bruit/JVD Psych Nl mood and affect   Lung CTA-B, unlabored, no DTP Lymph   No cervical or axillary LA   Ch wall NT, no deform Neuro  Nl gross M/S exam      ASSESSMENT TIME   N/A   ASSESSMENT AND PLAN   *CAD  Status No ACS, likely has obstructive CAD  Plan Echo pending    Long discussion regarding invasive v medical therapy   Patient prefers medical therapy at 89 and does not consent to invasive testing   Add imdur

## 2024-10-03 NOTE — ED PROVIDER NOTES
mmol/L    CO2 29 21 - 32 mmol/L    Anion Gap 12 3 - 16    Glucose 110 (H) 70 - 99 mg/dL    BUN 16 7 - 20 mg/dL    Creatinine 0.9 0.6 - 1.2 mg/dL    Est, Glom Filt Rate 59 (A) >60    Calcium 10.2 8.3 - 10.6 mg/dL   Magnesium   Result Value Ref Range    Magnesium 1.70 (L) 1.80 - 2.40 mg/dL   Phosphorus   Result Value Ref Range    Phosphorus 3.0 2.5 - 4.9 mg/dL   Troponin   Result Value Ref Range    Troponin, High Sensitivity 10 0 - 14 ng/L   Hepatic Function Panel   Result Value Ref Range    Total Protein 7.9 6.4 - 8.2 g/dL    Albumin 4.1 3.4 - 5.0 g/dL    Alkaline Phosphatase 104 40 - 129 U/L    ALT 15 10 - 40 U/L    AST 26 15 - 37 U/L    Total Bilirubin 0.6 0.0 - 1.0 mg/dL    Bilirubin, Direct 0.2 0.0 - 0.3 mg/dL    Bilirubin, Indirect 0.4 0.0 - 1.0 mg/dL   Lipase   Result Value Ref Range    Lipase 33.0 13.0 - 60.0 U/L   Troponin   Result Value Ref Range    Troponin, High Sensitivity 8 0 - 14 ng/L     EKG   EKG as interpreted by me shows the patient to be in a normal sinus rhythm with a normal axis, first-degree AV block with MA interval of 234, normal QT interval, normal QRS duration, no ST segment abnormalities, no T wave abnormalities, borderline LVH present.    ED BEDSIDE ULTRASOUND:  No results found.    MOST RECENT VITALS:  BP: (!) 163/67,Temp: 97.8 °F (36.6 °C), Pulse: 65, Respirations: 14, SpO2: 95 %     Procedures     N/A    ED Course     Nursing Notes, Past Medical Hx, Past Surgical Hx, Social Hx,Allergies, and Family Hx were reviewed.         The patient was given the following medications:  Orders Placed This Encounter   Medications    aspirin chewable tablet 324 mg    ondansetron (ZOFRAN) injection 4 mg    potassium chloride (KLOR-CON M) extended release tablet 40 mEq    magnesium sulfate 1000 mg in dextrose 5% 100 mL IVPB       CONSULTS:  None    Review of Systems     Review of Systems   Constitutional: Negative.    HENT: Negative.     Eyes: Negative.    Respiratory: Negative.     Cardiovascular:   Positive for chest pain. Negative for palpitations and leg swelling.   Gastrointestinal:  Positive for nausea. Negative for abdominal pain, constipation, diarrhea and vomiting.   Genitourinary: Negative.    Musculoskeletal: Negative.    Neurological: Negative.    All other systems reviewed and are negative.      Past Medical, Surgical, Family, and Social History     She has a past medical history of Hyperlipidemia and Hypertension.  She has no past surgical history on file.  Her family history is not on file.  She reports that she has never smoked. She has never used smokeless tobacco. She reports that she does not currently use alcohol. She reports that she does not currently use drugs.    Medications     Previous Medications    ASPIRIN 81 MG EC TABLET    Take 81 mg by mouth daily    ATENOLOL (TENORMIN) 100 MG TABLET    Take 150 mg by mouth daily    ATORVASTATIN (LIPITOR) 80 MG TABLET    TAKE 1 TABLET BY MOUTH  DAILY    LISINOPRIL-HYDROCHLOROTHIAZIDE (PRINZIDE;ZESTORETIC) 10-12.5 MG PER TABLET    Take 1 tablet by mouth daily    POTASSIUM CHLORIDE (KLOR-CON M) 20 MEQ EXTENDED RELEASE TABLET    Take 1 tablet by mouth daily       Allergies     She has No Known Allergies.    Physical Exam     INITIAL VITALS: BP: (!) 194/73, Temp: 97.8 °F (36.6 °C), Pulse: 71, Respirations: 18, SpO2: 94 %   Physical Exam  Vitals and nursing note reviewed.   Constitutional:       General: She is not in acute distress.  HENT:      Head: Normocephalic and atraumatic.      Mouth/Throat:      Mouth: Mucous membranes are moist.      Pharynx: No oropharyngeal exudate.   Eyes:      General: No scleral icterus.     Extraocular Movements: Extraocular movements intact.      Conjunctiva/sclera: Conjunctivae normal.      Pupils: Pupils are equal, round, and reactive to light.   Cardiovascular:      Rate and Rhythm: Normal rate and regular rhythm.      Heart sounds: Normal heart sounds.   Pulmonary:      Effort: Pulmonary effort is normal.

## 2024-10-03 NOTE — DISCHARGE INSTRUCTIONS
Dear Ms. Nemesio,     You were brought to [facility] ER after having exertional chest pain while cleaning your home. This was largely relieved with nitroglycerin but because some pain lingered you came to the hospital. Blood work, EKG and an echo demonstrated normal heart function and you were assessed by our Cardiologist Dr. Ross who determined there is a low likelihood of a heart attack but recommended you start another medication to control your blood pressure. You were medically cleared for discharge on 10/03 with the following medication changes:     -STOP Lisinopril-Hydrochlorothiazide 10-12.5 tab ONCE DAILY  -STOP POTASSIUM CHLORIDE 20mEq tab ONCE DAILY   -STOP Hydralazine 10mg (1 tab) THREE TIMES DAILY    -START Bidil (Isosorbide Hydralazine 20-37.5mg) 1 TAB TWICE DAILY   -START Diovan (Valsartan 160mg) 1 tab ONCE NIGHTLY    -START Zofran 4mg three times daily as needed for nausea    We would also recommend you schedule a follow-up appointment with your PCP within a week, to review your blood pressure regimen.    If you experience an acute change please do not hesitate to visit our ER or call 911.     It was a pleasure to care for you,

## 2024-10-03 NOTE — CARE COORDINATION
Case Management Assessment  Initial Evaluation    Date/Time of Evaluation: 10/3/2024 10:35 AM  Assessment Completed by: Ema Helm RN    If patient is discharged prior to next notation, then this note serves as note for discharge by case management.    Patient Name: Lin Herr                   YOB: 1931  Diagnosis: Chest pain [R07.9]  Chest pain, unspecified type [R07.9]                   Date / Time: 10/2/2024 11:06 PM    Patient Admission Status: Inpatient   Readmission Risk (Low < 19, Mod (19-27), High > 27): Readmission Risk Score: 9.1    Current PCP: Johanny Daniels MD  PCP verified by CM? Yes ([place])    Chart Reviewed: Yes      History Provided by: Patient  Patient Orientation: Person, Alert and Oriented, Place, Situation, Self    Patient Cognition: Alert    Hospitalization in the last 30 days (Readmission):  No    If yes, Readmission Assessment in  Navigator will be completed.    Advance Directives:      Code Status: Full Code   Patient's Primary Decision Maker is: Legal Next of Kin      Discharge Planning:    Patient lives with: Children Type of Home: House  Primary Care Giver: Self  Patient Support Systems include: Children   Current Financial resources: Medicare  Current community resources: None  Current services prior to admission: Durable Medical Equipment            Current DME: Cane, Walker            Type of Home Care services:  None    ADLS  Prior functional level: Independent in ADLs/IADLs  Current functional level: Independent in ADLs/IADLs    PT AM-PAC: 20 /24  OT AM-PAC: 24 /24    Family can provide assistance at DC: Yes (lives with daughter- Carolynn)  Would you like Case Management to discuss the discharge plan with any other family members/significant others, and if so, who? Yes (daughter)  Plans to Return to Present Housing: Yes  Other Identified Issues/Barriers to RETURNING to current housing: none  Potential Assistance needed at discharge: N/A            Potential

## 2024-10-03 NOTE — PROGRESS NOTES
4 Eyes Admission Assessment     I agree as the admission nurse that 2 RN's have performed a thorough Head to Toe Skin Assessment on the patient. ALL assessment sites listed below have been assessed on admission.       Areas assessed by both nurses:   [x]   Head, Face, and Ears   [x]   Shoulders, Back, and Chest  [x]   Arms, Elbows, and Hands   [x]   Coccyx, Sacrum, and Ischum  [x]   Legs, Feet, and Heels        Does the Patient have Skin Breakdown?  No         Arthur Prevention initiated:  No   Wound Care Orders initiated:  No      [facility] nurse consulted for Pressure Injury (Stage 3,4, Unstageable, DTI, NWPT, and Complex wounds):  No      Nurse 1 eSignature: Electronically signed by Husam Carrillo RN on 10/3/24 at 3:18 AM EDT    **SHARE this note so that the co-signing nurse is able to place an eSignature**    Nurse 2 eSignature: Electronically signed by Belkys Watters RN on 10/3/24 at 3:42 AM EDT

## 2024-10-04 VITALS
OXYGEN SATURATION: 96 % | TEMPERATURE: 97.8 F | BODY MASS INDEX: 25.27 KG/M2 | HEIGHT: 64 IN | DIASTOLIC BLOOD PRESSURE: 72 MMHG | SYSTOLIC BLOOD PRESSURE: 181 MMHG | RESPIRATION RATE: 18 BRPM | WEIGHT: 148 LBS | HEART RATE: 69 BPM

## 2024-10-04 LAB
ANION GAP SERPL CALCULATED.3IONS-SCNC: 5 MMOL/L (ref 3–16)
BASOPHILS # BLD: 0 K/UL (ref 0–0.2)
BASOPHILS NFR BLD: 0.5 %
BUN SERPL-MCNC: 16 MG/DL (ref 7–20)
CALCIUM SERPL-MCNC: 9.3 MG/DL (ref 8.3–10.6)
CHLORIDE SERPL-SCNC: 99 MMOL/L (ref 99–110)
CO2 SERPL-SCNC: 28 MMOL/L (ref 21–32)
CREAT SERPL-MCNC: 0.9 MG/DL (ref 0.6–1.2)
DEPRECATED RDW RBC AUTO: 13.9 % (ref 12.4–15.4)
EOSINOPHIL # BLD: 0.1 K/UL (ref 0–0.6)
EOSINOPHIL NFR BLD: 2.2 %
GFR SERPLBLD CREATININE-BSD FMLA CKD-EPI: 59 ML/MIN/{1.73_M2}
GLUCOSE SERPL-MCNC: 93 MG/DL (ref 70–99)
HCT VFR BLD AUTO: 35.5 % (ref 36–48)
HGB BLD-MCNC: 11.6 G/DL (ref 12–16)
LYMPHOCYTES # BLD: 1.5 K/UL (ref 1–5.1)
LYMPHOCYTES NFR BLD: 25.1 %
MCH RBC QN AUTO: 27.6 PG (ref 26–34)
MCHC RBC AUTO-ENTMCNC: 32.7 G/DL (ref 31–36)
MCV RBC AUTO: 84.5 FL (ref 80–100)
MONOCYTES # BLD: 0.8 K/UL (ref 0–1.3)
MONOCYTES NFR BLD: 13.1 %
NEUTROPHILS # BLD: 3.6 K/UL (ref 1.7–7.7)
NEUTROPHILS NFR BLD: 59.1 %
PLATELET # BLD AUTO: 265 K/UL (ref 135–450)
PMV BLD AUTO: 8.5 FL (ref 5–10.5)
POTASSIUM SERPL-SCNC: 3.9 MMOL/L (ref 3.5–5.1)
RBC # BLD AUTO: 4.2 M/UL (ref 4–5.2)
SODIUM SERPL-SCNC: 132 MMOL/L (ref 136–145)
WBC # BLD AUTO: 6.1 K/UL (ref 4–11)

## 2024-10-04 PROCEDURE — G0378 HOSPITAL OBSERVATION PER HR: HCPCS

## 2024-10-04 PROCEDURE — 6370000000 HC RX 637 (ALT 250 FOR IP)

## 2024-10-04 PROCEDURE — 85025 COMPLETE CBC W/AUTO DIFF WBC: CPT

## 2024-10-04 PROCEDURE — 96376 TX/PRO/DX INJ SAME DRUG ADON: CPT

## 2024-10-04 PROCEDURE — 96372 THER/PROPH/DIAG INJ SC/IM: CPT

## 2024-10-04 PROCEDURE — 2580000003 HC RX 258

## 2024-10-04 PROCEDURE — 36415 COLL VENOUS BLD VENIPUNCTURE: CPT

## 2024-10-04 PROCEDURE — 80048 BASIC METABOLIC PNL TOTAL CA: CPT

## 2024-10-04 PROCEDURE — 6360000002 HC RX W HCPCS

## 2024-10-04 RX ORDER — ISOSORBIDE DINITRATE 20 MG/1
20 TABLET ORAL 2 TIMES DAILY
Qty: 200 TABLET | Refills: 3 | Status: SHIPPED | OUTPATIENT
Start: 2024-10-04 | End: 2024-10-04 | Stop reason: HOSPADM

## 2024-10-04 RX ORDER — VALSARTAN 160 MG/1
160 TABLET ORAL NIGHTLY
Qty: 30 TABLET | Refills: 3 | Status: SHIPPED | OUTPATIENT
Start: 2024-10-04

## 2024-10-04 RX ORDER — ISOSORBIDE MONONITRATE 30 MG/1
30 TABLET, EXTENDED RELEASE ORAL DAILY
Qty: 30 TABLET | Refills: 3 | Status: CANCELLED | OUTPATIENT
Start: 2024-10-04

## 2024-10-04 RX ORDER — ISOSORBIDE DINITRATE AND HYDRALAZINE HYDROCHLORIDE 37.5; 2 MG/1; MG/1
1 TABLET ORAL 2 TIMES DAILY
Qty: 60 TABLET | Refills: 3 | Status: SHIPPED | OUTPATIENT
Start: 2024-10-04

## 2024-10-04 RX ORDER — ONDANSETRON 4 MG/1
4 TABLET, ORALLY DISINTEGRATING ORAL 3 TIMES DAILY PRN
Qty: 45 TABLET | Refills: 2 | Status: SHIPPED | OUTPATIENT
Start: 2024-10-04

## 2024-10-04 RX ADMIN — ATENOLOL 150 MG: 50 TABLET ORAL at 09:25

## 2024-10-04 RX ADMIN — SODIUM CHLORIDE, PRESERVATIVE FREE 10 ML: 5 INJECTION INTRAVENOUS at 09:27

## 2024-10-04 RX ADMIN — ENOXAPARIN SODIUM 40 MG: 100 INJECTION SUBCUTANEOUS at 09:27

## 2024-10-04 RX ADMIN — ONDANSETRON 4 MG: 2 INJECTION INTRAMUSCULAR; INTRAVENOUS at 11:36

## 2024-10-04 RX ADMIN — ATORVASTATIN CALCIUM 80 MG: 80 TABLET, FILM COATED ORAL at 09:26

## 2024-10-04 RX ADMIN — ISOSORBIDE MONONITRATE 30 MG: 30 TABLET, EXTENDED RELEASE ORAL at 09:26

## 2024-10-04 RX ADMIN — ASPIRIN 81 MG: 81 TABLET, COATED ORAL at 09:25

## 2024-10-04 NOTE — PROGRESS NOTES
Pt to d/c home w/ daughter. Reviewed d/c instructions with pt, pt agreeable to d/c. Pt sent home with all personal belongings.IV and tele removed. Electronically signed by Erica Pedraza RN on 10/4/2024 at 3:58 PM

## 2024-10-04 NOTE — CARE COORDINATION
PT recommends Home therapy.   I met with the patient and daughter at bedside, informed of recommendation. Patient declines. States she feels confident to go home with her daughter.    I informed her if she thinks after getting home that she should have Home therapy, she can call her Primary Care Doctor and request to have HC arranged, Agrees    Daughter states she can transport the patient home when discharged.

## 2024-11-16 ENCOUNTER — APPOINTMENT (OUTPATIENT)
Dept: CT IMAGING | Age: 89
End: 2024-11-16
Payer: MEDICARE

## 2024-11-16 ENCOUNTER — HOSPITAL ENCOUNTER (EMERGENCY)
Age: 89
Discharge: HOME | End: 2024-11-16
Attending: EMERGENCY MEDICINE
Payer: MEDICARE

## 2024-11-16 VITALS
HEIGHT: 62 IN | DIASTOLIC BLOOD PRESSURE: 79 MMHG | TEMPERATURE: 98.1 F | SYSTOLIC BLOOD PRESSURE: 204 MMHG | RESPIRATION RATE: 15 BRPM | HEART RATE: 66 BPM | OXYGEN SATURATION: 99 % | BODY MASS INDEX: 27.07 KG/M2 | WEIGHT: 147.1 LBS

## 2024-11-16 DIAGNOSIS — K62.5 RECTAL BLEEDING: Primary | ICD-10-CM

## 2024-11-16 LAB
ALBUMIN SERPL-MCNC: 3.8 G/DL (ref 3.4–5)
ALBUMIN/GLOB SERPL: 1.3 {RATIO} (ref 1.1–2.2)
ALP SERPL-CCNC: 90 U/L (ref 40–129)
ALT SERPL-CCNC: 11 U/L (ref 10–40)
ANION GAP SERPL CALCULATED.3IONS-SCNC: 7 MMOL/L (ref 3–16)
APTT BLD: 30.8 SEC (ref 22.1–36.4)
AST SERPL-CCNC: 22 U/L (ref 15–37)
BASOPHILS # BLD: 0 K/UL (ref 0–0.2)
BASOPHILS NFR BLD: 0.6 %
BILIRUB SERPL-MCNC: 0.5 MG/DL (ref 0–1)
BILIRUB UR QL STRIP.AUTO: NEGATIVE
BUN SERPL-MCNC: 16 MG/DL (ref 7–20)
CALCIUM SERPL-MCNC: 9.5 MG/DL (ref 8.3–10.6)
CHLORIDE SERPL-SCNC: 103 MMOL/L (ref 99–110)
CLARITY UR: CLEAR
CO2 SERPL-SCNC: 30 MMOL/L (ref 21–32)
COLOR UR: YELLOW
CREAT SERPL-MCNC: 0.9 MG/DL (ref 0.6–1.2)
DEPRECATED RDW RBC AUTO: 14.4 % (ref 12.4–15.4)
EOSINOPHIL # BLD: 0.2 K/UL (ref 0–0.6)
EOSINOPHIL NFR BLD: 3.3 %
GFR SERPLBLD CREATININE-BSD FMLA CKD-EPI: 59 ML/MIN/{1.73_M2}
GLUCOSE SERPL-MCNC: 105 MG/DL (ref 70–99)
GLUCOSE UR STRIP.AUTO-MCNC: NEGATIVE MG/DL
HCT VFR BLD AUTO: 35.3 % (ref 36–48)
HGB BLD-MCNC: 11.7 G/DL (ref 12–16)
HGB UR QL STRIP.AUTO: NEGATIVE
INR PPP: 1.1 (ref 0.85–1.15)
KETONES UR STRIP.AUTO-MCNC: NEGATIVE MG/DL
LEUKOCYTE ESTERASE UR QL STRIP.AUTO: NEGATIVE
LIPASE SERPL-CCNC: 23 U/L (ref 13–60)
LYMPHOCYTES # BLD: 1.6 K/UL (ref 1–5.1)
LYMPHOCYTES NFR BLD: 32.1 %
MAGNESIUM SERPL-MCNC: 1.75 MG/DL (ref 1.8–2.4)
MCH RBC QN AUTO: 27.6 PG (ref 26–34)
MCHC RBC AUTO-ENTMCNC: 33.2 G/DL (ref 31–36)
MCV RBC AUTO: 83.2 FL (ref 80–100)
MONOCYTES # BLD: 0.5 K/UL (ref 0–1.3)
MONOCYTES NFR BLD: 10.9 %
NEUTROPHILS # BLD: 2.7 K/UL (ref 1.7–7.7)
NEUTROPHILS NFR BLD: 53.1 %
NITRITE UR QL STRIP.AUTO: NEGATIVE
PH UR STRIP.AUTO: 7.5 [PH] (ref 5–8)
PLATELET # BLD AUTO: 277 K/UL (ref 135–450)
PMV BLD AUTO: 8.7 FL (ref 5–10.5)
POTASSIUM SERPL-SCNC: 3.5 MMOL/L (ref 3.5–5.1)
PROT SERPL-MCNC: 6.7 G/DL (ref 6.4–8.2)
PROT UR STRIP.AUTO-MCNC: NEGATIVE MG/DL
PROTHROMBIN TIME: 14.4 SEC (ref 11.9–14.9)
RBC # BLD AUTO: 4.25 M/UL (ref 4–5.2)
SODIUM SERPL-SCNC: 140 MMOL/L (ref 136–145)
SP GR UR STRIP.AUTO: 1.01 (ref 1–1.03)
UA COMPLETE W REFLEX CULTURE PNL UR: NORMAL
UA DIPSTICK W REFLEX MICRO PNL UR: NORMAL
URN SPEC COLLECT METH UR: NORMAL
UROBILINOGEN UR STRIP-ACNC: 0.2 E.U./DL
WBC # BLD AUTO: 5.1 K/UL (ref 4–11)

## 2024-11-16 PROCEDURE — 80053 COMPREHEN METABOLIC PANEL: CPT

## 2024-11-16 PROCEDURE — 85730 THROMBOPLASTIN TIME PARTIAL: CPT

## 2024-11-16 PROCEDURE — 74174 CTA ABD&PLVS W/CONTRAST: CPT

## 2024-11-16 PROCEDURE — 83690 ASSAY OF LIPASE: CPT

## 2024-11-16 PROCEDURE — 85610 PROTHROMBIN TIME: CPT

## 2024-11-16 PROCEDURE — 85025 COMPLETE CBC W/AUTO DIFF WBC: CPT

## 2024-11-16 PROCEDURE — 6360000004 HC RX CONTRAST MEDICATION: Performed by: EMERGENCY MEDICINE

## 2024-11-16 PROCEDURE — 99285 EMERGENCY DEPT VISIT HI MDM: CPT

## 2024-11-16 PROCEDURE — 83735 ASSAY OF MAGNESIUM: CPT

## 2024-11-16 PROCEDURE — 81003 URINALYSIS AUTO W/O SCOPE: CPT

## 2024-11-16 RX ORDER — POLYETHYLENE GLYCOL 3350 17 G/17G
17 POWDER, FOR SOLUTION ORAL 2 TIMES DAILY
Qty: 510 G | Refills: 0 | Status: SHIPPED | OUTPATIENT
Start: 2024-11-16 | End: 2024-12-16

## 2024-11-16 RX ORDER — IOPAMIDOL 755 MG/ML
75 INJECTION, SOLUTION INTRAVASCULAR
Status: COMPLETED | OUTPATIENT
Start: 2024-11-16 | End: 2024-11-16

## 2024-11-16 RX ADMIN — IOPAMIDOL 75 ML: 755 INJECTION, SOLUTION INTRAVENOUS at 06:39

## 2024-11-16 ASSESSMENT — LIFESTYLE VARIABLES
HOW MANY STANDARD DRINKS CONTAINING ALCOHOL DO YOU HAVE ON A TYPICAL DAY: PATIENT DOES NOT DRINK
HOW OFTEN DO YOU HAVE A DRINK CONTAINING ALCOHOL: NEVER

## 2024-11-16 ASSESSMENT — PAIN - FUNCTIONAL ASSESSMENT: PAIN_FUNCTIONAL_ASSESSMENT: NONE - DENIES PAIN

## 2024-11-16 NOTE — DISCHARGE INSTRUCTIONS
You were seen in the emergency department for rectal bleeding.     Please follow-up with your primary care doctor next week regarding the bleeding and the nodules that were found in your lungs.     Please take the stool softeners as prescribed.     Please return to the ED if you have ongoing bleeding, abdominal pain, lightheadedness, shortness of breath, chest pain, fast heart rate, fainting, or other concerns for your health.

## 2024-11-16 NOTE — ED PROVIDER NOTES
[facility] ED Reassessment Note    Lin Herr is a 89 y.o. female patient who presented to the Emergency Department. This patient was initially seen by an off-going provider. Please see that provider's note for details regarding the initial history, physical exam and ED course.    Care of this patient was signed out to me.    ED Course and MDM     Lin Herr is a 89 y.o. female with history of HTN who presented to the emergency department with rectal bleeding.    A thorough history and physical was performed.    Briefly this is a 89-year-old female who presented with bleeding per rectum that is occurred since last night.  She had 1 episode with blood on the toilet paper after wiping and 1 where some blood was in the toilet bowl though no large blood clots.  She did have an uncomfortable feeling in her abdomen but no true pain.  No associated nausea or vomiting, no melena, no fevers.  On my examination she has a soft, nontender abdomen.  She did have another bowel movement while in the emergency department today and stated that she did not have any ongoing bleeding.    Her workup here demonstrated a stable hemoglobin and largely unremarkable other laboratory evaluations.    At this time, the following is pending:   - CTA abdomen    CT of the abdomen did not demonstrate any acute intra-abdominal or pelvic findings and no evidence of active gastrointestinal hemorrhage.  There is a large retrocardiac hiatal hernia as well as some small pulmonary nodules.  She was informed of these nodules and the need for follow-up.    I discussed with the patient that I suspect this is likely hemorrhoidal bleeding and venous in nature.  The fact that she is not having ongoing bleeding is reassuring.  She has remained hemodynamically stable although hypertensive in the emergency department.  She has not taken her antihypertensives this morning as she has been in the emergency department and she plans to take these when she goes home.   She was instructed to follow-up with her primary care doctor and if she has ongoing hemorrhoidal bleeding she may need to see gastroenterology or colorectal surgery.  Her family was present in the room at the time and also expressed understanding of this.  She did not have any questions.  She was given specific return precautions for this including ongoing bleeding, lightheadedness or other signs of hemorrhage with anemia.    Summary of Treatment in ED:      Medications   iopamidol (ISOVUE-370) 76 % injection 75 mL (75 mLs IntraVENous Given 11/16/24 2396)       Impression     No diagnosis found.                   Shakeel Owens MD  11/16/24 3731

## 2024-11-16 NOTE — ED PROVIDER NOTES
[facility]  EMERGENCY DEPARTMENT ENCOUNTER          ATTENDING PHYSICIAN NOTE       Date of evaluation: 11/16/2024    Chief Complaint     Rectal Bleeding (Patient presents to ED with report of rectal bleeding this morning. Patient reports an episode earlier in the morning with blood noted on the toilet paper after a bowel movement. Patient reports a bowel movement later this morning with more bright red blood in the toilet. Patient denies any pain, reports \"grumbling\" in her abdomen.)      History of Present Illness     Lin Herr is a 89 y.o. female who presents who complains of blood per rectum this morning.  The patient reports that about midnight last night she had a bowel movement, thought that she might of had a little bit of blood on the toilet paper when she wiped, but had another 1 just prior to coming to the emergency department this morning that reportedly had red blood in the toilet bowl and on the toilet paper when she wiped.  She denies any lopez abdominal pain but says she has a uncomfortable feeling in her lower abdomen, slightly on the left-hand side.  Denies any dysuria.  Denies chest pain fevers chills or shortness of breath.  Reports that she takes a baby aspirin daily but no other chronic anticoagulation.      ASSESSMENT / PLAN  (MEDICAL DECISION MAKING)     INITIAL VITALS: BP: (!) 222/103, Temp: 97.4 °F (36.3 °C), Pulse: 73, Respirations: 20, SpO2: 99 %      Lin Herr is a 89 y.o. female generally well in appearance who presents with blood per rectum today.  On my rectal exam there are some external hemorrhoids, some small red blood clots, no gross active bleeding otherwise.  Abdomen is soft and nontender but as patient complained of some abdominal discomfort we will obtain CT imaging of the abdomen for evaluation of GI bleed as well.  I suspect likely hemorrhoidal source.  She is nontoxic.  Hemoglobin is stable.  Remainder of labs are thus far nonactionable.  At shift  Total Protein 6.7 6.4 - 8.2 g/dL    Albumin 3.8 3.4 - 5.0 g/dL    Albumin/Globulin Ratio 1.3 1.1 - 2.2    Total Bilirubin 0.5 0.0 - 1.0 mg/dL    Alkaline Phosphatase 90 40 - 129 U/L    ALT 11 10 - 40 U/L    AST 22 15 - 37 U/L   Protime-INR   Result Value Ref Range    Protime 14.4 11.9 - 14.9 sec    INR 1.10 0.85 - 1.15   APTT   Result Value Ref Range    aPTT 30.8 22.1 - 36.4 sec   Lipase   Result Value Ref Range    Lipase 23.0 13.0 - 60.0 U/L   Magnesium   Result Value Ref Range    Magnesium 1.75 (L) 1.80 - 2.40 mg/dL     EKG       ED BEDSIDE ULTRASOUND:  No results found.    MOST RECENT VITALS:  BP: (!) 222/103,Temp: 97.4 °F (36.3 °C), Pulse: 73, Respirations: 20, SpO2: 99 %     Procedures         ED Course     Nursing Notes, Past Medical Hx, Past Surgical Hx, Social Hx,Allergies, and Family Hx were reviewed.         The patient was given the following medications:  Orders Placed This Encounter   Medications    iopamidol (ISOVUE-370) 76 % injection 75 mL       CONSULTS:  None    Review of Systems     Review of Systems  Pertinent positives and negatives are listed in the HPI; otherwise all systems are reviewed and were negative.     Past Medical, Surgical, Family, and Social History     She has a past medical history of Hyperlipidemia and Hypertension.  She has no past surgical history on file.  Her family history is not on file.  She reports that she has never smoked. She has never used smokeless tobacco. She reports that she does not currently use alcohol. She reports that she does not currently use drugs.    Medications     Previous Medications    ASPIRIN 81 MG EC TABLET    Take 1 tablet by mouth daily    ATENOLOL (TENORMIN) 100 MG TABLET    Take 1.5 tablets by mouth daily    ATORVASTATIN (LIPITOR) 80 MG TABLET    TAKE 1 TABLET BY MOUTH  DAILY    ISOSORBIDE-HYDRALAZINE (BIDIL) 20-37.5 MG PER TABLET    Take 1 tablet by mouth 2 times daily    ONDANSETRON (ZOFRAN-ODT) 4 MG DISINTEGRATING TABLET    Take 1 tablet by

## 2024-11-16 NOTE — ED NOTES
/79 on discharge, pt asymptomatic, states that she is due for her AM BP meds when she gets home. Provider notified     Isis Huber RN  11/16/24 [id number]